# Patient Record
Sex: FEMALE | Race: WHITE | NOT HISPANIC OR LATINO | URBAN - METROPOLITAN AREA
[De-identification: names, ages, dates, MRNs, and addresses within clinical notes are randomized per-mention and may not be internally consistent; named-entity substitution may affect disease eponyms.]

---

## 2022-09-22 ENCOUNTER — TELEMEDICINE (OUTPATIENT)
Dept: PSYCHIATRY | Facility: CLINIC | Age: 42
End: 2022-09-22
Payer: COMMERCIAL

## 2022-09-22 DIAGNOSIS — F43.10 POST TRAUMATIC STRESS DISORDER (PTSD): ICD-10-CM

## 2022-09-22 DIAGNOSIS — F41.0 PANIC DISORDER (EPISODIC PAROXYSMAL ANXIETY): ICD-10-CM

## 2022-09-22 DIAGNOSIS — F41.1 GAD (GENERALIZED ANXIETY DISORDER): ICD-10-CM

## 2022-09-22 DIAGNOSIS — F98.8 ATTENTION DEFICIT DISORDER PREDOMINANT INATTENTIVE TYPE: ICD-10-CM

## 2022-09-22 DIAGNOSIS — F33.2 MODERATELY SEVERE RECURRENT MAJOR DEPRESSION (HCC): Primary | ICD-10-CM

## 2022-09-22 PROCEDURE — 90792 PSYCH DIAG EVAL W/MED SRVCS: CPT | Performed by: NURSE PRACTITIONER
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
## 2022-10-03 ENCOUNTER — TELEPHONE (OUTPATIENT)
Dept: PSYCHIATRY | Facility: CLINIC | Age: 42
End: 2022-10-03

## 2022-10-04 PROBLEM — F43.10 POST TRAUMATIC STRESS DISORDER (PTSD): Status: ACTIVE | Noted: 2022-10-04

## 2022-10-04 PROBLEM — F41.1 GAD (GENERALIZED ANXIETY DISORDER): Status: ACTIVE | Noted: 2022-10-04

## 2022-10-04 PROBLEM — F33.2 MODERATELY SEVERE RECURRENT MAJOR DEPRESSION (HCC): Status: ACTIVE | Noted: 2022-10-04

## 2022-10-04 PROBLEM — F41.0 PANIC DISORDER (EPISODIC PAROXYSMAL ANXIETY): Status: ACTIVE | Noted: 2022-10-04

## 2022-10-04 PROBLEM — F98.8 ATTENTION DEFICIT DISORDER PREDOMINANT INATTENTIVE TYPE: Status: ACTIVE | Noted: 2022-10-04

## 2022-10-07 ENCOUNTER — TELEMEDICINE (OUTPATIENT)
Dept: BEHAVIORAL/MENTAL HEALTH CLINIC | Facility: CLINIC | Age: 42
End: 2022-10-07
Payer: COMMERCIAL

## 2022-10-07 DIAGNOSIS — F33.2 MODERATELY SEVERE RECURRENT MAJOR DEPRESSION (HCC): Primary | ICD-10-CM

## 2022-10-07 PROCEDURE — 90791 PSYCH DIAGNOSTIC EVALUATION: CPT
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
## 2022-10-28 ENCOUNTER — TELEMEDICINE (OUTPATIENT)
Dept: BEHAVIORAL/MENTAL HEALTH CLINIC | Facility: CLINIC | Age: 42
End: 2022-10-28

## 2022-10-28 DIAGNOSIS — F33.2 MODERATELY SEVERE RECURRENT MAJOR DEPRESSION (HCC): Primary | ICD-10-CM

## 2022-10-28 DIAGNOSIS — F41.1 GAD (GENERALIZED ANXIETY DISORDER): ICD-10-CM

## 2022-10-28 NOTE — PSYCH
Psychotherapy Provided: Individual Psychotherapy 44 minutes     Length of time in session: 44 minutes, follow up in 1 week    Encounter Diagnosis     ICD-10-CM    1  Moderately severe recurrent major depression ([address] )  F33 2    2  DAYAMI (generalized anxiety disorder)  F41 1        Goals addressed in session: Goal 1, Goal 2 and Goal 3      Pain:      none    0    Current suicide risk : Low     Present during the session was Юлия Menezes and therapist   Lilliana Santiago discussed with therapist recent difficulties pertaining to her relationship with her mother  She became tearful when she talked about her mothers recent pace maker surgery and stated she does not want her mother to die  She stated her mother and her are fighting due to her mother not asking for help when she needs it  Юлия Riri stated her mom is toxic and that she needs to get out of the house  Therapist checked in with Юлия Menezes about how the process of filling out SSI paperwork has been going  Therapist asked if Юлия Menezes would be open to filling out an BUCK for therapist to connect with her CM, kira was open to receiving BUCK paperwork  Юлия Menezes informed therapist she broke up with her previous boyfriend due to lack of communication and is in a new relationship  Юлия Menezes became tearful when talking about her children and the lack of relationship  Toimarylou thought process was tangential and difficult to follow at times  Юлия Riri was informed therapist can no longer do virtual due to state regulations  Юлия Menezes is going to be finding transportation through her mother or [facility] transport  The tx plan was completed  ASSESSMENT- The overall mood of Kira was depressed, tearful, and engaged as evidenced by discussing   Interventions used during this session were CBT and DBT informed interventions, mindfulness, problem solving, solution focused and client based       PLAN: The next session is scheduled for tbd, kira needs to discuss transportation with mother and will focus on following up on Kira's depressed mood and completion of SSI paperwork  Behavioral Health Treatment Plan [facility]: Diagnosis and Treatment Plan explained to Reji Strickland relates understanding diagnosis and is agreeable to Treatment Plan  Yes   Virtual Regular Visit    Verification of patient location:    Patient is located in the following state in which I hold an active license [place]      Assessment/Plan:    Problem List Items Addressed This Visit        Other    Moderately severe recurrent major depression ([address] ) - Primary    DAYAMI (generalized anxiety disorder)          Goals addressed in session: Goal 1, Goal 2 and Goal 3           Reason for visit is No chief complaint on file  Encounter provider JOSEE Woods    Provider located at [address]  [address]  [phone]      Recent Visits  No visits were found meeting these conditions  Showing recent visits within past 7 days and meeting all other requirements  Future Appointments  No visits were found meeting these conditions  Showing future appointments within next 150 days and meeting all other requirements       The patient was identified by name and date of birth  Maryagnes Burkitt was informed that this is a telemedicine visit and that the visit is being conducted throughthe [employer] platform  She agrees to proceed     My office door was closed  No one else was in the room  She acknowledged consent and understanding of privacy and security of the video platform  The patient has agreed to participate and understands they can discontinue the visit at any time  Patient is aware this is a billable service  Subjective  Maryagnes Burkitt is a 36 y o  female  HPI     No past medical history on file  No past surgical history on file  No current outpatient medications on file       No current facility-administered medications for this visit  No Known Allergies    Review of Systems    Video Exam    There were no vitals filed for this visit      Physical Exam     Visit Time    Visit Start Time: 11:18 AM  Visit Stop Time: 12:10 PM  Total Visit Duration: 52 minutes

## 2022-10-28 NOTE — BH TREATMENT PLAN
Bridget Dueñas  1980       Date of Initial Treatment Plan: 10/28/22   Date of Current Treatment Plan: 10/28/22    Treatment Plan Number 1     Strengths/Personal Resources for Self Care: music, creative, good mom    Diagnosis:   1  Moderately severe recurrent major depression ([address] )     2  DAYAMI (generalized anxiety disorder)         Area of Needs: emotional regulation, depression symptoms, negative self-talk      Long Term Goal 1: "I need help managing my emotions"    Target Date: 4/28/23  Completion Date: TBD         Short Term Objectives for Goal 1: Payton Cleary will explore emotional regulation and identification techniques with therapist  She will master 5 DBT skills over the next 6 months  Long Term Goal 2: "I want to just decrease my depression"    Target Date: 4/28/23  Completion Date: TBD    Short Term Objectives for Goal 2: Payton Cleary will utilize CBT skills of thought challenging and reframing when negative self talk arises  This will decresae feelings of worthlessness 75% over the next 6 months  Long Term Goal # 3: "I want to get my life together "     Target Date: 4/28/22  Completion Date: TBD    Short Term Objectives for Goal 3: Payton Cleary will continue to meet with her  to complete steps to get SSI and affordable housing  Short Term Objectives for Goal 3: Payton Cleary will develop healthy communication skills to use when speaking with her children to increase visitation  GOAL 1: Modality: Individual 4x per month   Completion Date TBD    GOAL 2: Modality: Individual 4x per month   Completion Date TBD     GOAL 3: Modality: Individual 4x per month   Completion Date TBD      Behavioral Health Treatment Plan  Luke: Diagnosis and Treatment Plan explained to Shanice Gimeneza relates understanding diagnosis and is agreeable to Treatment Plan         Client Comments : Please share your thoughts, feelings, need and/or experiences regarding your treatment plan: "I like it, thank you "    Memo Timmons, 1980, actively participated in the creation of this treatment plan during a virtual session, using the [employer] platform  Memo Timmons  provided verbal consent on 10/28/2022 at 12:03 PM  The treatment plan was transcribed into the [address] Record at a later time

## 2022-11-04 ENCOUNTER — SOCIAL WORK (OUTPATIENT)
Dept: BEHAVIORAL/MENTAL HEALTH CLINIC | Facility: CLINIC | Age: 42
End: 2022-11-04

## 2022-11-04 NOTE — PSYCH
Psychotherapy Provided: Individual Psychotherapy 50 minutes     Length of time in session: 50 minutes    No diagnosis found  Goals addressed in session: Goal 1 and Goal 2     Pain:      none    0    Current suicide risk : Low     Present during the session was Alanna Carrero and therapist     Isi Malone - Therapist began the session by explaining to Alanna Carrero the need to transfer her to a new therapist  Therapist was able to explain to Alanna Carrero that she will begin seeing the new therapist, Nikkie Post next week  Alanna Carrreo was understanding  Kira used to remainder of the session to discuss progress being made in receiving SSI  Kira emailed therapist a form to fill out, therapist will forward to medical records  Alanna Carrero became tearful when talking about her children and how she continues to have an estranged relationship with her son  Alanna Carrero was able to reframe her negative thoughts to focus on bettering her own mental health before she can be the best mom she can be  ASSESSMENT- The overall mood of Kira was calm, engaged, and tearful at times as evidenced by actively participating in the session  Interventions used during this session were CBT and DBT informed interventions, mindfulness, problem solving, solution focused and client based  PLAN: The next session will be with her new therapist Jim Batres: Diagnosis and Treatment Plan explained to Lavon Almas relates understanding diagnosis and is agreeable to Treatment Plan   Yes     Visit start and stop times:    11/04/22  Start Time: 1015  Stop Time: 1105  Total Visit Time: 50 minutes

## 2022-11-17 ENCOUNTER — TELEMEDICINE (OUTPATIENT)
Dept: BEHAVIORAL/MENTAL HEALTH CLINIC | Facility: CLINIC | Age: 42
End: 2022-11-17

## 2022-11-17 DIAGNOSIS — F43.10 POST TRAUMATIC STRESS DISORDER (PTSD): Primary | ICD-10-CM

## 2022-11-21 NOTE — PSYCH
Virtual Regular Visit    Verification of patient location:    Patient is located in the following state in which I hold an active license [place]      Assessment/Plan:    Problem List Items Addressed This Visit        Other    Post traumatic stress disorder (PTSD) - Primary       Goals addressed in session: Goal 1          Reason for visit is No chief complaint on file  Encounter provider Yung Roman, [facility]    Provider located at [address] [place] [address] [address]  [address] [place] [address]  [phone]      Recent Visits  Date Type Provider Dept   11/17/22 JOSE Chavira 51, 1407 [facility] Therapist [facility]   Showing recent visits within past 7 days and meeting all other requirements  Future Appointments  No visits were found meeting these conditions  Showing future appointments within next 150 days and meeting all other requirements       The patient was identified by name and date of birth  Dallas Shepherd was informed that this is a telemedicine visit and that the visit is being conducted throughthe [employer] platform  She agrees to proceed     My office door was closed  No one else was in the room  She acknowledged consent and understanding of privacy and security of the video platform  The patient has agreed to participate and understands they can discontinue the visit at any time  Patient is aware this is a billable service  Ori Shepherd is a 36 y o  female    HPI     No past medical history on file  No past surgical history on file  No current outpatient medications on file  No current facility-administered medications for this visit  No Known Allergies    Review of Systems    Video Exam    There were no vitals filed for this visit  Physical Exam     D: This was this therapist and client's first session together  Therapist and client began to establish therapeutic rapport  Client shared some of her trauma hx, and her diagnosis of PTSD  Client shared that she dissociates  Client stated that she has had a BCM through [facility] since approximately 2014  Client shared that she has been sober from alcohol for 7 years, and has been off of heroin for 2 years  Client stated that she would like to work on her various relationships in therapy  A: Client appeared to be oriented x3  Client appeared to be in a pleasant mood  Client was very friendly  P: Therapist and client will meet again in 1 week, and will continue to establish therapeutic rapport      11/17/22  Start Time: 1504  Stop Time: 5273  Total Visit Time: 51 minutes

## 2022-11-30 ENCOUNTER — TELEMEDICINE (OUTPATIENT)
Dept: BEHAVIORAL/MENTAL HEALTH CLINIC | Facility: CLINIC | Age: 42
End: 2022-11-30

## 2022-11-30 DIAGNOSIS — F33.2 MODERATELY SEVERE RECURRENT MAJOR DEPRESSION (HCC): Primary | ICD-10-CM
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
## 2022-12-05 ENCOUNTER — TELEMEDICINE (OUTPATIENT)
Dept: BEHAVIORAL/MENTAL HEALTH CLINIC | Facility: CLINIC | Age: 42
End: 2022-12-05

## 2022-12-05 DIAGNOSIS — F33.2 MODERATELY SEVERE RECURRENT MAJOR DEPRESSION (HCC): Primary | ICD-10-CM

## 2022-12-05 NOTE — PSYCH
Virtual Regular Visit    Verification of patient location:    Patient is located in the following state in which I hold an active license [place]      Assessment/Plan:    Problem List Items Addressed This Visit        Other    Moderately severe recurrent major depression ([address] ) - Primary       Goals addressed in session: Goal 1 and Goal 2          Reason for visit is No chief complaint on file  Encounter provider Radha Bates, [facility]    Provider located at [address] [place] [address]  [phone]      Recent Visits  Date Type Provider Dept   11/30/22 Telemedicine Radha Bates, [facility]   Showing recent visits within past 7 days and meeting all other requirements  Today's Visits  Date Type Provider Dept   12/05/22 Telemedicine Radha Bates, [facility]   Showing today's visits and meeting all other requirements  Future Appointments  No visits were found meeting these conditions  Showing future appointments within next 150 days and meeting all other requirements       The patient was identified by name and date of birth  Naomi Mock was informed that this is a telemedicine visit and that the visit is being conducted throughthe [employer] platform  She agrees to proceed     My office door was closed  No one else was in the room  She acknowledged consent and understanding of privacy and security of the video platform  The patient has agreed to participate and understands they can discontinue the visit at any time  Patient is aware this is a billable service  Subjective  Naomi Mock is a 36 y o  female    HPI     No past medical history on file  No past surgical history on file  No current outpatient medications on file  No current facility-administered medications for this visit          No Known Allergies    Review of Systems    Video Exam    There were no vitals filed for this visit  Physical Exam     D: Client signed into session late, and was crying and rapidly sharing her thoughts with therapist  Client repeatedly stated that she "does not want to be here " Client denied having any plan, intent, or means to harm herself  Therapist and client discussed the reasons that client has to live  Client discussed her plans for the future  Client shared that she was upset mainly because of the stress she experiences due to living with her mother, Eleonora Murray  Client was told that she needs to immediately switch rooms  She is also worried that she will lose custody of the uncle she cares for due to authorities stating that they are coming out to make sure he is being cared for properly  In addition, client stated that she misses her children, and is very upset that she cannot see her son, Will  Therapist and client contracted for safety  Client acknowledged having the contact information for Crisis  A: Client appeared to be distraught due to current life stressors such as housing, familial relationships, and romantic relationships  Client appeared to be angry towards her mother  P: Therapist and client will meet again in 1 week, and will continue to build upon client's healthy coping skills        12/05/22  Start Time: 1120  Stop Time: 7947  Total Visit Time: 48 minutes

## 2022-12-15 ENCOUNTER — TELEMEDICINE (OUTPATIENT)
Dept: BEHAVIORAL/MENTAL HEALTH CLINIC | Facility: CLINIC | Age: 42
End: 2022-12-15

## 2022-12-15 DIAGNOSIS — F43.10 POST TRAUMATIC STRESS DISORDER (PTSD): Primary | ICD-10-CM

## 2022-12-19 ENCOUNTER — TELEMEDICINE (OUTPATIENT)
Dept: BEHAVIORAL/MENTAL HEALTH CLINIC | Facility: CLINIC | Age: 42
End: 2022-12-19

## 2022-12-19 DIAGNOSIS — F43.10 POST TRAUMATIC STRESS DISORDER (PTSD): Primary | ICD-10-CM
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
## 2022-12-20 NOTE — PSYCH
Virtual Regular Visit    Verification of patient location:    Patient is located in the following [place] in [place]      Assessment/Plan:    Problem List Items Addressed This Visit        Other    Post traumatic stress disorder (PTSD) - Primary       Goals addressed in session: Goal 2          Reason for visit is No chief complaint on file  Encounter provider Brody Esposito, [facility]    Provider located at [address] [place] [address] [address]  [address] [place] [address]  [phone]      Recent Visits  Date Type Provider Dept   12/19/22 R Precious Chavira 51, [facility] [facility] Therapist [facility]   12/15/22 Telemedicine Brody Espostio, [address] [facility] Therapist [facility]   Showing recent visits within past 7 days and meeting all other requirements  Future Appointments  No visits were found meeting these conditions  Showing future appointments within next 150 days and meeting all other requirements       The patient was identified by name and date of birth  Carlton Méndez was informed that this is a telemedicine visit and that the visit is being conducted throughthe [employer] platform  She agrees to proceed     My office door was closed  No one else was in the room  She acknowledged consent and understanding of privacy and security of the video platform  The patient has agreed to participate and understands they can discontinue the visit at any time  Patient is aware this is a billable service  Subjective  Carlton Méndez is a 36 y o  female    HPI     No past medical history on file  No past surgical history on file  No current outpatient medications on file  No current facility-administered medications for this visit  No Known Allergies    Review of Systems    Video Exam    There were no vitals filed for this visit      Physical Exam     D: Client shared that her son had visited her the weekend prior to this session, and that it had gone well  Client shared that she is setting up a studio in her attic for herself, her kids, and her friends  Client continued to franticly and angrily explain the struggles she deals with living in her house with her mother  Client repeatedly asked therapist if she was going to leave [facility], and seemed to suggest having therapist sign a contract stating such, and stated that her son's therapist signed a contract stating that she was not leaving  Client appeared to jump between topics, and repeatedly came back to angrily state that "someone should be fired" because she "always" gets kicked off of [facility]  A: Client appeared to be anxious and frantic at times, as well as angry and crying off and on      P: Therapist will meet with client again in 1 week, and will continue to discuss client's emotions      12/19/22  Start Time: 1102  Stop Time: 1375  Total Visit Time: 42 minutes

## 2022-12-29 ENCOUNTER — TELEMEDICINE (OUTPATIENT)
Dept: BEHAVIORAL/MENTAL HEALTH CLINIC | Facility: CLINIC | Age: 42
End: 2022-12-29

## 2022-12-29 DIAGNOSIS — F33.2 MODERATELY SEVERE RECURRENT MAJOR DEPRESSION (HCC): Primary | ICD-10-CM

## 2022-12-29 DIAGNOSIS — F41.1 GAD (GENERALIZED ANXIETY DISORDER): ICD-10-CM
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
## 2023-01-03 ENCOUNTER — TELEPHONE (OUTPATIENT)
Dept: PSYCHIATRY | Facility: CLINIC | Age: 43
End: 2023-01-03

## 2023-01-05 ENCOUNTER — TELEMEDICINE (OUTPATIENT)
Dept: BEHAVIORAL/MENTAL HEALTH CLINIC | Facility: CLINIC | Age: 43
End: 2023-01-05

## 2023-01-05 DIAGNOSIS — F41.0 PANIC DISORDER (EPISODIC PAROXYSMAL ANXIETY): Primary | ICD-10-CM

## 2023-01-05 DIAGNOSIS — F43.10 POST TRAUMATIC STRESS DISORDER (PTSD): ICD-10-CM

## 2023-01-09 ENCOUNTER — TELEMEDICINE (OUTPATIENT)
Dept: BEHAVIORAL/MENTAL HEALTH CLINIC | Facility: CLINIC | Age: 43
End: 2023-01-09

## 2023-01-09 DIAGNOSIS — F33.2 MODERATELY SEVERE RECURRENT MAJOR DEPRESSION (HCC): Primary | ICD-10-CM
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
## 2023-01-16 ENCOUNTER — TELEMEDICINE (OUTPATIENT)
Dept: BEHAVIORAL/MENTAL HEALTH CLINIC | Facility: CLINIC | Age: 43
End: 2023-01-16

## 2023-01-16 DIAGNOSIS — F43.10 POST TRAUMATIC STRESS DISORDER (PTSD): Primary | ICD-10-CM

## 2023-01-16 DIAGNOSIS — F33.2 MODERATELY SEVERE RECURRENT MAJOR DEPRESSION (HCC): ICD-10-CM
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
## 2023-01-23 ENCOUNTER — TELEMEDICINE (OUTPATIENT)
Dept: BEHAVIORAL/MENTAL HEALTH CLINIC | Facility: CLINIC | Age: 43
End: 2023-01-23

## 2023-01-23 DIAGNOSIS — F43.10 POST TRAUMATIC STRESS DISORDER (PTSD): ICD-10-CM

## 2023-01-23 DIAGNOSIS — F33.2 MODERATELY SEVERE RECURRENT MAJOR DEPRESSION (HCC): Primary | ICD-10-CM
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
## 2023-01-30 ENCOUNTER — TELEMEDICINE (OUTPATIENT)
Dept: BEHAVIORAL/MENTAL HEALTH CLINIC | Facility: CLINIC | Age: 43
End: 2023-01-30

## 2023-01-30 DIAGNOSIS — F33.2 MODERATELY SEVERE RECURRENT MAJOR DEPRESSION (HCC): Primary | ICD-10-CM

## 2023-01-30 DIAGNOSIS — F43.10 POST TRAUMATIC STRESS DISORDER (PTSD): ICD-10-CM

## 2023-02-06 ENCOUNTER — TELEMEDICINE (OUTPATIENT)
Dept: BEHAVIORAL/MENTAL HEALTH CLINIC | Facility: CLINIC | Age: 43
End: 2023-02-06

## 2023-02-06 DIAGNOSIS — F43.10 POST TRAUMATIC STRESS DISORDER (PTSD): Primary | ICD-10-CM

## 2023-02-06 DIAGNOSIS — F33.2 MODERATELY SEVERE RECURRENT MAJOR DEPRESSION (HCC): ICD-10-CM

## 2023-02-06 NOTE — PSYCH
Behavioral Health Psychotherapy Progress Note    Psychotherapy Provided: Individual Psychotherapy     1  Moderately severe recurrent major depression ([facility] [address] )        2  Post traumatic stress disorder (PTSD)            Goals addressed in session: Goal 3      DATA: Client shared that she had spent most of the past weekend with her daughter, Nessa Patel  Client shared that she has started having vivid dreams again, both positive and negative dreams, but appeared relieved to be having them again  Therapist and client continued to process her relationship with her son, Vitor  Client stated that she had asked Candida Sneed to ask Will to visit client for the weekend  Therapist challenged client when she stated she was respecting [facility] boundaries and desire for space  Every time therapist challenged client's thinking, choices, and behaviors, client stated that she was doing what therapist was recommending, for example, stating that she was respecting Leroy Abisai  Client stated that she continues to urinate on herself daily due to PTSD, and that stress is her trigger  During this session, this clinician used the following therapeutic modalities: Engagement Strategies, Cognitive Behavioral Therapy, Family Therapy, Solution-Focused Therapy and Supportive Psychotherapy    Substance Abuse was not addressed during this session  If the client is diagnosed with a co-occurring substance use disorder, please indicate any changes in the frequency or amount of use: no longer uses  Stage of change for addressing substance use diagnoses: Maintenance    ASSESSMENT:  Amador Marrero presents with a Euthymic/ normal, Angry and Anxious mood  her affect is Normal range and intensity, which is congruent, with her mood and the content of the session  The client has made progress on their goals  Amador Marrero presents with a none risk of suicide, none risk of self-harm, and none risk of harm to others      For any risk assessment that surpasses a "low" rating, a safety plan must be developed  A safety plan was indicated: no  If yes, describe in detail NA    PLAN: Between sessions, Willie Rosen will respect boundaries  At the next session, the therapist will use Engagement Strategies, Cognitive Behavioral Therapy, Family Therapy, Mindfulness-based Strategies, Solution-Focused Therapy and Supportive Psychotherapy to address boundaries and coping skills  Behavioral Health Treatment Plan and Discharge Planning: Willie Rosen is aware of and agrees to continue to work on their treatment plan  They have identified and are working toward their discharge goals  yes    Virtual Regular Visit    Verification of patient location:    Patient is located in the following state in which I hold an active license [place]      Assessment/Plan:    Problem List Items Addressed This Visit        Other    Moderately severe recurrent major depression ([facility] [address] ) - Primary    Post traumatic stress disorder (PTSD)       Goals addressed in session: Goal 3           Reason for visit is No chief complaint on file  Encounter provider Pradeep Crespo, [facility]    Provider located at [address] [place] [address]  [phone]      Recent Visits  Date Type Provider Dept   01/30/23 Telemedicine Pradeep Crespo, [address] [place] [address] Therapist op   Showing recent visits within past 7 days and meeting all other requirements  Future Appointments  No visits were found meeting these conditions  Showing future appointments within next 150 days and meeting all other requirements       The patient was identified by name and date of birth  Willie Rosen was informed that this is a telemedicine visit and that the visit is being conducted throughthe [employer] platform  She agrees to proceed     My office door was closed  No one else was in the room    She acknowledged consent and understanding of privacy and security of the video platform  The patient has agreed to participate and understands they can discontinue the visit at any time  Patient is aware this is a billable service  Subjective  Jean Pierer Syed is a 36 y o  female    HPI     No past medical history on file  No past surgical history on file  No current outpatient medications on file  No current facility-administered medications for this visit  No Known Allergies    Review of Systems    Video Exam    There were no vitals filed for this visit      Physical Exam     01/30/23  Start Time: 1101  Stop Time: 8189  Total Visit Time: 44 minutes

## 2023-02-13 ENCOUNTER — TELEPHONE (OUTPATIENT)
Dept: PSYCHIATRY | Facility: CLINIC | Age: 43
End: 2023-02-13

## 2023-02-19 NOTE — PSYCH
Behavioral Health Psychotherapy Progress Note    Psychotherapy Provided: Individual Psychotherapy     1  Post traumatic stress disorder (PTSD)        2  Moderately severe recurrent major depression ([facility] [address] )            Goals addressed in session: Goal 1 and Goal 2     DATA: Client shared that she has begun to have vivid dreams again, and that she sees this as a sign of her beginning to heal  Therapist and client continued to process some of the trauma that client has experienced, and how this impacts her various relationships including those with Chioma De La O, her father, Jeanie Sibley, and her brother  During this session, this clinician used the following therapeutic modalities: Engagement Strategies, Mindfulness-based Strategies and Supportive Psychotherapy    Substance Abuse was addressed during this session  If the client is diagnosed with a co-occurring substance use disorder, please indicate any changes in the frequency or amount of use: NA  Stage of change for addressing substance use diagnoses: Maintenance    ASSESSMENT:  Gennaro Chang presents with a Euthymic/ normal, Anxious, Depressed and Elated mood  her affect is Overbright and Tearful, which is congruent, with her mood and the content of the session  The client has made progress on their goals  Gennaro Chang presents with a none risk of suicide, none risk of self-harm, and none risk of harm to others  For any risk assessment that surpasses a "low" rating, a safety plan must be developed  A safety plan was indicated: no  If yes, describe in detail NA    PLAN: Between sessions, Gennaro Chang will use her coping skills  At the next session, the therapist will use Engagement Strategies, Cognitive Processing Therapy, Mindfulness-based Strategies, Solution-Focused Therapy and Supportive Psychotherapy to address trauma hx and coping skills      Behavioral Health Treatment Plan and Discharge Planning: Gennaro Chang is aware of and agrees to continue to work on their treatment plan  They have identified and are working toward their discharge goals  yes    Virtual Regular Visit    Verification of patient location:    Patient is located in the following state in which I hold an active license [place]      Assessment/Plan:    Problem List Items Addressed This Visit        Other    Moderately severe recurrent major depression ([address] )    Post traumatic stress disorder (PTSD) - Primary       Goals addressed in session: Goal 1 and Goal 2          Reason for visit is No chief complaint on file  Encounter provider Ana Lacey [facility]    Provider located at [address] [place] [address]  [phone]      Recent Visits  Date Type Provider Dept   02/13/23 Telephone Ana Lacey, [address] recent visits within past 7 days and meeting all other requirements  Future Appointments  No visits were found meeting these conditions  Showing future appointments within next 150 days and meeting all other requirements       The patient was identified by name and date of birth  Shane Whitaker was informed that this is a telemedicine visit and that the visit is being conducted throughthe [employer] platform  She agrees to proceed     My office door was closed  No one else was in the room  She acknowledged consent and understanding of privacy and security of the video platform  The patient has agreed to participate and understands they can discontinue the visit at any time  Patient is aware this is a billable service  Subjective  Shane Whitaker is a 36 y o  female    HPI     No past medical history on file  No past surgical history on file  No current outpatient medications on file  No current facility-administered medications for this visit          No Known Allergies    Review of Systems    Video Exam    There were no vitals filed for this visit     Physical Exam     02/06/23  Start Time: 3169  Stop Time: 8802  Total Visit Time: 39 minutes

## 2023-02-20 ENCOUNTER — TELEMEDICINE (OUTPATIENT)
Dept: BEHAVIORAL/MENTAL HEALTH CLINIC | Facility: CLINIC | Age: 43
End: 2023-02-20

## 2023-02-20 DIAGNOSIS — F41.1 GAD (GENERALIZED ANXIETY DISORDER): ICD-10-CM

## 2023-02-20 DIAGNOSIS — F43.10 POST TRAUMATIC STRESS DISORDER (PTSD): ICD-10-CM

## 2023-02-20 DIAGNOSIS — F33.2 MODERATELY SEVERE RECURRENT MAJOR DEPRESSION (HCC): Primary | ICD-10-CM

## 2023-02-27 ENCOUNTER — TELEMEDICINE (OUTPATIENT)
Dept: BEHAVIORAL/MENTAL HEALTH CLINIC | Facility: CLINIC | Age: 43
End: 2023-02-27

## 2023-02-27 DIAGNOSIS — F33.2 MODERATELY SEVERE RECURRENT MAJOR DEPRESSION (HCC): ICD-10-CM

## 2023-02-27 DIAGNOSIS — F41.1 GAD (GENERALIZED ANXIETY DISORDER): ICD-10-CM

## 2023-02-27 DIAGNOSIS — F43.10 POST TRAUMATIC STRESS DISORDER (PTSD): Primary | ICD-10-CM

## 2023-03-06 NOTE — PSYCH
Behavioral Health Psychotherapy Progress Note    Psychotherapy Provided: Individual Psychotherapy     1  Moderately severe recurrent major depression ([address] )        2  DAYAMI (generalized anxiety disorder)        3  Post traumatic stress disorder (PTSD)            Goals addressed in session: Goal 1 and Goal 2     DATA: Therapist provided psycho-education on breaking things down into more manageable pieces  Therapist and client continued to process client's feelings about her mother, Flaca Jensen  Therapist and client continued to process client's anger towards her brother  Client identified that across her dreams is the theme of "conquering evil" through Judaism  When asked about her appeared increased interest in Judaism, 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      During this session, this clinician used the following therapeutic modalities: Engagement Strategies and Supportive Psychotherapy    Substance Abuse was not addressed during this session  If the client is diagnosed with a co-occurring substance use disorder, please indicate any changes in the frequency or amount of use: NA  Stage of change for addressing substance use diagnoses: Maintenance    ASSESSMENT:  Taran Loredo presents with a Euthymic/ normal, Angry, Anxious and Depressed mood  her affect is intense, which is congruent, with her mood and the content of the session  The client has made progress on their goals  Taran Loredo presents with a none risk of suicide, none risk of self-harm, and minimal risk of harm to others  For any risk assessment that surpasses a "low" rating, a safety plan must be developed  A safety plan was indicated: no  If yes, describe in detail NA    PLAN: Between sessions, Taran Loredo will use her coping skills   At the next session, the therapist will use Engagement Strategies, Cognitive Processing Therapy and Supportive Psychotherapy to address client's hx of trauma  Behavioral Health Treatment Plan and Discharge Planning: Nica Boone is aware of and agrees to continue to work on their treatment plan  They have identified and are working toward their discharge goals  yes    Virtual Regular Visit    Verification of patient location:    Patient is located in the following state in which I hold an active license [place]      Assessment/Plan:    Problem List Items Addressed This Visit        Other    Moderately severe recurrent major depression ([facility] [address] [facility] ) - Primary    DAYAMI (generalized anxiety disorder)    Post traumatic stress disorder (PTSD)       Goals addressed in session: Goal 1 and Goal 2          Reason for visit is No chief complaint on file  Encounter provider Yomaira Weiner, [facility]    Provider located at [address] [place], [place] [id number]  [address] [place] [address]  [phone]      Recent Visits  No visits were found meeting these conditions  Showing recent visits within past 7 days and meeting all other requirements  Future Appointments  No visits were found meeting these conditions  Showing future appointments within next 150 days and meeting all other requirements       The patient was identified by name and date of birth  Nica Boone was informed that this is a telemedicine visit and that the visit is being conducted throughthe [employer] platform  She agrees to proceed     My office door was closed  No one else was in the room  She acknowledged consent and understanding of privacy and security of the video platform  The patient has agreed to participate and understands they can discontinue the visit at any time  Patient is aware this is a billable service  Subjective  Nica Boone is a 36 y o  female    HPI     No past medical history on file  No past surgical history on file  No current outpatient medications on file       No current facility-administered medications for this visit  No Known Allergies    Review of Systems    Video Exam    There were no vitals filed for this visit      Physical Exam     02/20/23  Start Time: 1107  Stop Time: 0656  Total Visit Time: 40 minutes
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
## 2023-03-13 ENCOUNTER — TELEMEDICINE (OUTPATIENT)
Dept: BEHAVIORAL/MENTAL HEALTH CLINIC | Facility: CLINIC | Age: 43
End: 2023-03-13

## 2023-03-13 DIAGNOSIS — F43.10 POST TRAUMATIC STRESS DISORDER (PTSD): Primary | ICD-10-CM

## 2023-03-13 DIAGNOSIS — F33.2 MODERATELY SEVERE RECURRENT MAJOR DEPRESSION (HCC): ICD-10-CM

## 2023-03-20 ENCOUNTER — TELEMEDICINE (OUTPATIENT)
Dept: BEHAVIORAL/MENTAL HEALTH CLINIC | Facility: CLINIC | Age: 43
End: 2023-03-20

## 2023-03-20 DIAGNOSIS — F43.10 POST TRAUMATIC STRESS DISORDER (PTSD): Primary | ICD-10-CM
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
## 2023-04-03 ENCOUNTER — TELEMEDICINE (OUTPATIENT)
Dept: BEHAVIORAL/MENTAL HEALTH CLINIC | Facility: CLINIC | Age: 43
End: 2023-04-03

## 2023-04-03 DIAGNOSIS — F33.2 MODERATELY SEVERE RECURRENT MAJOR DEPRESSION (HCC): Primary | ICD-10-CM

## 2023-04-03 DIAGNOSIS — F43.10 POST TRAUMATIC STRESS DISORDER (PTSD): ICD-10-CM
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
## 2023-04-10 ENCOUNTER — TELEMEDICINE (OUTPATIENT)
Dept: BEHAVIORAL/MENTAL HEALTH CLINIC | Facility: CLINIC | Age: 43
End: 2023-04-10

## 2023-04-10 DIAGNOSIS — F43.10 POST TRAUMATIC STRESS DISORDER (PTSD): ICD-10-CM

## 2023-04-10 DIAGNOSIS — F33.2 MODERATELY SEVERE RECURRENT MAJOR DEPRESSION (HCC): Primary | ICD-10-CM

## 2023-04-27 NOTE — PSYCH
"Behavioral Health Psychotherapy Progress Note    Psychotherapy Provided: Individual Psychotherapy     1  Moderately severe recurrent major depression ([address] )        2  Post traumatic stress disorder (PTSD)            Goals addressed in session: Goal 1 and Goal 3      DATA:  Therapist and client continued to discuss and process client's various relationships, focusing on those with her mother, son, and brother  Client cried off and on throughout session  Client appears to be struggling to move past her hospitalization and PFA from December, and the sense of betrayal by her mother for that  During this session, this clinician used the following therapeutic modalities: Engagement Strategies, Cognitive Behavioral Therapy, Family Therapy and Supportive Psychotherapy    Substance Abuse was not addressed during this session  If the client is diagnosed with a co-occurring substance use disorder, please indicate any changes in the frequency or amount of use: NA  Stage of change for addressing substance use diagnoses: Maintenance    ASSESSMENT:  Eve Rouse presents with a Angry and Depressed mood  her affect is Tearful and intense, which is congruent, with her mood and the content of the session  The client has made progress on their goals  Eve Rouse presents with a none risk of suicide, none risk of self-harm, and none risk of harm to others  For any risk assessment that surpasses a \"low\" rating, a safety plan must be developed  A safety plan was indicated: no  If yes, describe in detail NA    PLAN: Between sessions, Eve Rouse will practice setting and respecting boundaries  At the next session, the therapist will use Mindfulness-based Strategies to address coping skill building  Behavioral Health Treatment Plan and Discharge Planning: Eve Rouse is aware of and agrees to continue to work on their treatment plan  They have identified and are working toward their discharge goals   " yes    Virtual Regular Visit    Verification of patient location:    Patient is located at Home in the following state in which I hold an active license [place]      Assessment/Plan:    Problem List Items Addressed This Visit        Other    Moderately severe recurrent major depression ([address] ) - Primary    Post traumatic stress disorder (PTSD)       Goals addressed in session: Goal 1 and Goal 3           Reason for visit is No chief complaint on file  Encounter provider Deepa Alford, [facility]    Provider located at [address] [place] [address]  [phone]      Recent Visits  No visits were found meeting these conditions  Showing recent visits within past 7 days and meeting all other requirements  Future Appointments  No visits were found meeting these conditions  Showing future appointments within next 150 days and meeting all other requirements       The patient was identified by name and date of birth  Adam Mai was informed that this is a telemedicine visit and that the visit is being conducted throughthe [employer] platform  She agrees to proceed     My office door was closed  No one else was in the room  She acknowledged consent and understanding of privacy and security of the video platform  The patient has agreed to participate and understands they can discontinue the visit at any time  Patient is aware this is a billable service  Subjective  Adam Mai is a 39 y o  female    HPI     No past medical history on file  No past surgical history on file  No current outpatient medications on file  No current facility-administered medications for this visit  No Known Allergies    Review of Systems    Video Exam    There were no vitals filed for this visit      Physical Exam     04/10/23  Start Time: 7048  Stop Time: 1148  Total Visit Time: 46 minutes

## 2023-05-01 ENCOUNTER — TELEMEDICINE (OUTPATIENT)
Dept: BEHAVIORAL/MENTAL HEALTH CLINIC | Facility: CLINIC | Age: 43
End: 2023-05-01

## 2023-05-01 DIAGNOSIS — F41.1 GAD (GENERALIZED ANXIETY DISORDER): ICD-10-CM

## 2023-05-01 DIAGNOSIS — F33.2 MODERATELY SEVERE RECURRENT MAJOR DEPRESSION (HCC): Primary | ICD-10-CM

## 2023-05-01 DIAGNOSIS — F41.0 PANIC DISORDER (EPISODIC PAROXYSMAL ANXIETY): ICD-10-CM

## 2023-05-01 DIAGNOSIS — F98.8 ATTENTION DEFICIT DISORDER PREDOMINANT INATTENTIVE TYPE: ICD-10-CM

## 2023-05-01 DIAGNOSIS — F43.10 POST TRAUMATIC STRESS DISORDER (PTSD): ICD-10-CM

## 2023-05-01 NOTE — PSYCH
Behavioral Health Psychotherapy Progress Note    Psychotherapy Provided: Individual Psychotherapy     1  Moderately severe recurrent major depression ([address] )        2  DAYAMI (generalized anxiety disorder)        3  Post traumatic stress disorder (PTSD)        4  Panic disorder (episodic paroxysmal anxiety)        5  Attention deficit disorder predominant inattentive type            Goals addressed in session: Goal 1, Goal 2 and Goal 3      DATA: Therapist and client updated client's treatment plan  Client read an aggressive and accusatory letter that she had written and sent to her  due to him being unwilling to consult with her until she paid the $30 balance on her account  Client saw this as an abandonment of her and of his duties, due to her history of trauma and struggles with funds  Therapist challenged this, and pointed out that all , unless they agreed to work pro-irma, will expect payment for their services  Client acknowledged experiencing SI, but denied having any plan, intent, or means to harm herself  Client contracted for safety  Client shared that she believes her housemate, Preeti Parker, is stealing from her  Client changed back and forth between angry, and then sad and crying, throughout session  Whenever therapist challenged a statement or thinking pattern of client's, client would agree as though it was obvious, and contradict her previous statement, as though she had not made it  During this session, this clinician used the following therapeutic modalities: Cognitive Behavioral Therapy and Supportive Psychotherapy    Substance Abuse was not addressed during this session  If the client is diagnosed with a co-occurring substance use disorder, please indicate any changes in the frequency or amount of use: no change  Stage of change for addressing substance use diagnoses: No substance use/Not applicable    ASSESSMENT:  Hannah Tse presents with a Angry and Depressed mood       her affect is "Normal range and intensity and Tearful, which is congruent, with her mood and the content of the session  The client has made progress on their goals  Romel Estrada presents with a minimal risk of suicide, minimal risk of self-harm, and none risk of harm to others  For any risk assessment that surpasses a \"low\" rating, a safety plan must be developed  A safety plan was indicated: no  If yes, describe in detail NA    PLAN: Between sessions, Romel Estrada will use her coping skills  At the next session, the therapist will use Cognitive Behavioral Therapy to address challenging client's thinking patterns  Behavioral Health Treatment Plan and Discharge Planning: Romel Estrada is aware of and agrees to continue to work on their treatment plan  They have identified and are working toward their discharge goals  yes    Virtual Regular Visit    Verification of patient location:    Patient is located at Home in the following state in which I hold an active license [place]      Assessment/Plan:    Problem List Items Addressed This Visit        Other    Moderately severe recurrent major depression ([facility] ) - Primary    DAYAMI (generalized anxiety disorder)    Post traumatic stress disorder (PTSD)    Panic disorder (episodic paroxysmal anxiety)    Attention deficit disorder predominant inattentive type       Goals addressed in session: Goal 1, Goal 2 and Goal 3           Reason for visit is No chief complaint on file  Encounter provider Sondra Aguero [facility]    Provider located at [address] [place] [address]  [phone]      Recent Visits  No visits were found meeting these conditions    Showing recent visits within past 7 days and meeting all other requirements  Today's Visits  Date Type Provider Dept   05/01/23 Telemedicine Sondra Aguero [address] [place] [address] Therapist op   Showing today's visits and meeting all " other requirements  Future Appointments  No visits were found meeting these conditions  Showing future appointments within next 150 days and meeting all other requirements       The patient was identified by name and date of birth  Jerod Merry was informed that this is a telemedicine visit and that the visit is being conducted throughthe ipDatatel platform  She agrees to proceed     My office door was closed  No one else was in the room  She acknowledged consent and understanding of privacy and security of the video platform  The patient has agreed to participate and understands they can discontinue the visit at any time  Patient is aware this is a billable service  Subjective  Jerod Sousa is a 39 y o  female    HPI     No past medical history on file  No past surgical history on file  No current outpatient medications on file  No current facility-administered medications for this visit  No Known Allergies    Review of Systems    Video Exam    There were no vitals filed for this visit      Physical Exam     05/01/23  Start Time: 9530  Stop Time: 1150  Total Visit Time: 48 minutes

## 2023-05-01 NOTE — BH TREATMENT PLAN
"[address]  1980     Date of Initial Psychotherapy Assessment: 10/28/22  Date of Current Treatment Plan: 05/01/23  Treatment Plan Target Date: 10/31/23  Treatment Plan Expiration Date: 10/31/23    Diagnosis:   1  Moderately severe recurrent major depression ([address] )        2  DAYAMI (generalized anxiety disorder)        3  Post traumatic stress disorder (PTSD)        4  Panic disorder (episodic paroxysmal anxiety)        5  Attention deficit disorder predominant inattentive type            Area(s) of Need: Depression, PTSD, anxiety    Long Term Goal 1 (in the client's own words): \"I need help managing my emotions\"    Stage of Change: Contemplation    Target Date for completion: 10/31/23     Anticipated therapeutic modalities: CBT, coping skills     People identified to complete this goal: client, therapist      Objective 1: (identify the means of measuring success in meeting the objective): Isha Quigley will explore emotional regulation and identification techniques with therapist  She will master 5 DBT skills over the next 6 months  Objective 2: (identify the means of measuring success in meeting the objective): NA      Long Term Goal 2 (in the client's own words): \"I want to just decrease my depression\"    Stage of Change: Contemplation    Target Date for completion: 10/31/23     Anticipated therapeutic modalities: CBT, coping skills     People identified to complete this goal: client, therapist      Objective 1: (identify the means of measuring success in meeting the objective): Isha Warrenchdianna will utilize CBT skills of thought challenging and reframing when negative self talk arises  This will decresae feelings of worthlessness 75% over the next 6 months  Objective 2: (identify the means of measuring success in meeting the objective): NA     Long Term Goal 3 (in the client's own words): \"I want to get my life together  \"     Stage of Change: " Contemplation    Target Date for completion: 10/31/23     Anticipated therapeutic modalities: CBT, coping skills     People identified to complete this goal: client, therapist      Objective 1: (identify the means of measuring success in meeting the objective): Memo Gerardo will continue to meet with her  to complete steps to get SSI and affordable housing  Objective 2: (identify the means of measuring success in meeting the objective): Memo Gerardo will develop healthy communication skills to use when speaking with her children to increase visitation  I am currently under the care of a [facility] psychiatric provider: yes    My [facility] psychiatric provider is: Otf Reef am currently taking psychiatric medications: Yes, as prescribed    I feel that I will be ready for discharge from mental health care when I reach the following (measurable goal/objective): goals met    For children and adults who have a legal guardian:   Has there been any change to custody orders and/or guardianship status? NA  If yes, attach updated documentation  I have created my Crisis Plan and have been offered a copy of this plan    [address]: Diagnosis and Treatment Plan explained to [facility] acknowledges an understanding of their diagnosis  Sonia Temple agrees to this treatment plan      I have been offered a copy of this Treatment Plan  yes

## 2023-05-08 ENCOUNTER — TELEMEDICINE (OUTPATIENT)
Dept: BEHAVIORAL/MENTAL HEALTH CLINIC | Facility: CLINIC | Age: 43
End: 2023-05-08

## 2023-05-08 DIAGNOSIS — F41.1 GAD (GENERALIZED ANXIETY DISORDER): ICD-10-CM

## 2023-05-08 DIAGNOSIS — F43.10 POST TRAUMATIC STRESS DISORDER (PTSD): Primary | ICD-10-CM

## 2023-05-08 DIAGNOSIS — F33.2 MODERATELY SEVERE RECURRENT MAJOR DEPRESSION (HCC): ICD-10-CM

## 2023-05-08 NOTE — PSYCH
Behavioral Health Psychotherapy Progress Note    Psychotherapy Provided: Individual Psychotherapy     1  Post traumatic stress disorder (PTSD)        2  Moderately severe recurrent major depression ([address] )        3  DAYAMI (generalized anxiety disorder)            Goals addressed in session: Goal 1, Goal 2 and Goal 3      DATA: Client shared that the electricity might be turned off at [facility], and that her uncle Audery Libman won't be coming to live with them anymore  Client was crying and yelling off and on throughout the session, and was ranting  Client expressed experiencing SI but denied having any plan, intent, or means to harm herself  Client contracted for safety  Client listed what she has to live for  Client continued to express her anger that her son, Will, won't speak to her, and that [employer] cannot and will not, force Will to speak to her  Client also continued to express her anger that her  will not work with her anymore as she has a balance on her account  Client repeatedly yelled that she has not done anything wrong for negative things to be happening to her  Client ranted throughout the entirety of the session, so that therapist did not get to speak much during the session  During this session, this clinician used the following therapeutic modalities: Engagement Strategies, Family Therapy and Supportive Psychotherapy    Substance Abuse was not addressed during this session  If the client is diagnosed with a co-occurring substance use disorder, please indicate any changes in the frequency or amount of use: no change  Stage of change for addressing substance use diagnoses: Maintenance    ASSESSMENT:  Amy Maki presents with a Angry and Depressed mood  her affect is Tearful and very intense, which is congruent, with her mood and the content of the session  The client has not made progress on their goals       Amy Maki presents with a minimal risk of suicide, minimal risk of self-harm, "and none risk of harm to others  For any risk assessment that surpasses a \"low\" rating, a safety plan must be developed  A safety plan was indicated: no  If yes, describe in detail NA    PLAN: Between sessions, Andrea Bermudez will use her coping skills  At the next session, the therapist will use Mindfulness-based Strategies to address building coping skills  Behavioral Health Treatment Plan and Discharge Planning: Andrea Bermudez is aware of and agrees to continue to work on their treatment plan  They have identified and are working toward their discharge goals  yes    Virtual Regular Visit    Verification of patient location:    Patient is located at Home in the following state in which I hold an active license [place]      Assessment/Plan:    Problem List Items Addressed This Visit        Other    Moderately severe recurrent major depression ([facility] )    DAYAMI (generalized anxiety disorder)    Post traumatic stress disorder (PTSD) - Primary       Goals addressed in session: Goal 1, Goal 2 and Goal 3           Reason for visit is No chief complaint on file  Encounter provider Con Francisco, [facility]    Provider located at [address] [place] [address]  [phone]      Recent Visits  Date Type Provider Dept   05/01/23 Telemedicine Con Lamy, [address] Therapist [facility]   Showing recent visits within past 7 days and meeting all other requirements  Today's Visits  Date Type Provider Dept   05/08/23 Telemedicine Con Francisco, [address] Therapist [facility]   Showing today's visits and meeting all other requirements  Future Appointments  No visits were found meeting these conditions  Showing future appointments within next 150 days and meeting all other requirements       The patient was identified by name and date of birth   Andrea Bermudez was informed that this is a telemedicine visit and that the " visit is being conducted throughthe mGenerator platform  She agrees to proceed     My office door was closed  No one else was in the room  She acknowledged consent and understanding of privacy and security of the video platform  The patient has agreed to participate and understands they can discontinue the visit at any time  Patient is aware this is a billable service  Ori Mckee is a 39 y o  female    HPI     No past medical history on file  No past surgical history on file  No current outpatient medications on file  No current facility-administered medications for this visit  No Known Allergies    Review of Systems    Video Exam    There were no vitals filed for this visit      Physical Exam     05/08/23  Start Time: 1106  Stop Time: 1150  Total Visit Time: 44 minutes

## 2023-05-15 ENCOUNTER — TELEMEDICINE (OUTPATIENT)
Dept: BEHAVIORAL/MENTAL HEALTH CLINIC | Facility: CLINIC | Age: 43
End: 2023-05-15

## 2023-05-15 DIAGNOSIS — F33.2 MODERATELY SEVERE RECURRENT MAJOR DEPRESSION (HCC): Primary | ICD-10-CM

## 2023-05-15 DIAGNOSIS — F41.1 GAD (GENERALIZED ANXIETY DISORDER): ICD-10-CM

## 2023-05-15 DIAGNOSIS — F43.10 POST TRAUMATIC STRESS DISORDER (PTSD): ICD-10-CM

## 2023-05-15 NOTE — PSYCH
Behavioral Health Psychotherapy Progress Note    Psychotherapy Provided: Individual Psychotherapy     1  Moderately severe recurrent major depression ([facility] [address] )        2  DAYAMI (generalized anxiety disorder)        3  Post traumatic stress disorder (PTSD)            Goals addressed in session: Goal 1, Goal 2 and Goal 3      DATA: Therapist and client met virtually (Epic would not allow for therapist to add in a virtual session note after therapist completed a crisis plan note during session)  Client shared that she was diagnosed with diabetes last week  Client shared that she is in a lot of pain  Client shared that she has been struggling with scabies  Therapist and client continued to process client's feelings about missing her son, Will  While client acknowledged having SI, she denied having any plan, intent, or means to harm herself  Therapist and client created a crisis plan  Client shared that she is looking to transfer to [facility] for therapy due to [employer] being unable to force Will to engage in family therapy, and so all of her services are through one organization  Client was crying off and on throughout session  During this session, this clinician used the following therapeutic modalities: Cognitive Behavioral Therapy, Family Therapy and Supportive Psychotherapy    Substance Abuse was not addressed during this session  If the client is diagnosed with a co-occurring substance use disorder, please indicate any changes in the frequency or amount of use: no change  Stage of change for addressing substance use diagnoses: Maintenance    ASSESSMENT:  Kerry Lockett presents with a Euthymic/ normal, Angry and Depressed mood  her affect is intense, which is congruent, with her mood and the content of the session  The client has made progress on their goals  Kerry Lockett presents with a minimal risk of suicide, none risk of self-harm, and none risk of harm to others      For any risk assessment that "surpasses a \"low\" rating, a safety plan must be developed  A safety plan was indicated: yes  If yes, describe in detail crisis plan developed    PLAN: Between sessions, Elvia Puente will use her coping skills  At the next session, the therapist will use Mindfulness-based Strategies to address building coping skills  Behavioral Health Treatment Plan and Discharge Planning: Elvia Puente is aware of and agrees to continue to work on their treatment plan  They have identified and are working toward their discharge goals  yes    Visit start and stop times:    05/15/23  Start Time: 1115  Stop Time: 1150  Total Visit Time: 35 minutes     Virtual Regular Visit    Verification of patient location:    Patient is located at Home in the following state in which I hold an active license [place]      Assessment/Plan:    Problem List Items Addressed This Visit        Other    Moderately severe recurrent major depression ([address] ) - Primary    DAYAMI (generalized anxiety disorder)    Post traumatic stress disorder (PTSD)       Goals addressed in session: Goal 1, Goal 2 and Goal 3           Reason for visit is No chief complaint on file  Encounter provider Margi Gomes, [facility] [place]    Provider located at [address] [place] [address]  [phone]      Recent Visits  Date Type Provider Dept   05/08/23 Telemedicine GreenIQaleksey Gomes, [address] [facility] Therapist [facility]   Showing recent visits within past 7 days and meeting all other requirements  Today's Visits  Date Type Provider Dept   05/15/23 Telemedicine GreenIQaleksey Gomes [address] [facility] Therapist [facility]   Showing today's visits and meeting all other requirements  Future Appointments  No visits were found meeting these conditions  Showing future appointments within next 150 days and meeting all other requirements       The patient was identified by name and date of birth   Suoleymane Fletcher" Serena Abbasi was informed that this is a telemedicine visit and that the visit is being conducted throughthe [employer] platform  She agrees to proceed     My office door was closed  No one else was in the room  She acknowledged consent and understanding of privacy and security of the video platform  The patient has agreed to participate and understands they can discontinue the visit at any time  Patient is aware this is a billable service  Subjective  Sophie Fragoso is a 39 y o  female    HPI     No past medical history on file  No past surgical history on file  No current outpatient medications on file  No current facility-administered medications for this visit  No Known Allergies    Review of Systems    Video Exam    There were no vitals filed for this visit      Physical Exam     05/15/23  Start Time: 8135  Stop Time: 1150  Total Visit Time: 35 minutes

## 2023-05-15 NOTE — BH CRISIS PLAN
Client Name: Justin Sue       Client YOB: 1980  : 1980    Treatment Team (include name and contact information):     Psychotherapist: Aaron Talavera MS, LPC  noPsychiatrist: MICKY   Release of information completed: no    : Through Bozena RAGLAND [facility])   Release of information completed: yes    Other (Specify Role): NA    Release of information completed: no    Other (Specify Role): NA   Release of information completed: no    Healthcare Provider  Yariel Thomas DO  [address] [facility] [address]  [facility] [address]    Type of Plan   * Child plans (children 15 yo and younger) must be completed and signed by the child's legal guardian   * Plans for all individuals 15 yo and above must be signed by the client  Plan Type: adolescent/adult (15 and over) Update/Review      My Personal Strengths are (in the client's own words):  Smart, articulate, beautiful, sing, vast knowledge of art and music, good friend, humanitarian,     The stressors and triggers that may put me at risk are:  other (describe) not seeing my children, not having access of any sort to my children, people invading my space or taking my things without asking, anything too sexual    Coping skills I can use to keep myself calm and safe: Other (describe) Singing, self-care    Coping skills/supports I can use to maintain abstinence from substance use:   Friends, therapy    The people that provide me with help and support: (Include name, contact, and how they can help)   Support person #1: Aaron Talavera MS, [facility]    * Phone number: [phone]    * How can they help me? Therapy     Support person #2: [facility]    * Phone number:     * How can they help me? Support     Support person #3: NA    * Phone number: NA    * How can they help me?  NA    In the past, the following has helped me in times of crisis:    Taking medications and Using de-escalation tools that I have learned      If it is an emergency and you need immediate help, call 9-1-1    If there is a possibility of danger to yourself or others, call the following crisis hotline resources:     Adult Crisis Numbers  Suicide Prevention Hotline - Dial 9-8-8  [facility]: [address]: [address]: [address]: [phone]  [facility] ([facility]): [phone]  [facility]: [address] [place] [address] Avenue: [address] [place] [address] St: [phone] (daytime)  [phone] (after hours, weekends, holidays)     Child/Adolescent Crisis Numbers   [facility]: [address]: [phone]   Spike Maciel: [phone]   [facility] ([facility]): [phone]    Please note: Some [facility] do not have a separate number for Child/Adolescent specific crisis  If your county is not listed under Child/Adolescent, please call the adult number for your county     National Talk to Text Line   [place] - [phone]    In the event your feelings become unmanageable, and you cannot reach your support system, you will call 911 immediately or go to the nearest hospital emergency room

## 2023-05-22 ENCOUNTER — TELEMEDICINE (OUTPATIENT)
Dept: BEHAVIORAL/MENTAL HEALTH CLINIC | Facility: CLINIC | Age: 43
End: 2023-05-22

## 2023-05-22 DIAGNOSIS — F41.1 GAD (GENERALIZED ANXIETY DISORDER): ICD-10-CM

## 2023-05-22 DIAGNOSIS — F33.2 MODERATELY SEVERE RECURRENT MAJOR DEPRESSION (HCC): Primary | ICD-10-CM

## 2023-05-22 NOTE — PSYCH
Behavioral Health Psychotherapy Progress Note    Psychotherapy Provided: Individual Psychotherapy     1  Moderately severe recurrent major depression ([address] )        2  DAYAMI (generalized anxiety disorder)            Goals addressed in session: Goal 1, Goal 2 and Goal 3      DATA: Client signed in late to session  Client shared that she has a herniated disk in her back, but stated that she is not open to surgery  Client was cursing very frequently throughout session  Client expressed feeling overwhelmed due to not having custody of her children  Therapist encouraged client to focus on what steps she can take now in order to work towards that goal  Client denied any plan, intent, or means to harm herself, and contracted for safety  Client appeared to be very angry throughout session, and appeared to want to vent to therapist  Client expressed her anger towards her ex-, Shila Suazo, and her feeling that he is trying to limit her access to their children  Client also expressed her anger towards José, whose house she is staying in  Therapist continued to encourage client to focus on what steps she could take now towards stability  During this session, this clinician used the following therapeutic modalities: Solution-Focused Therapy and Supportive Psychotherapy    Substance Abuse was not addressed during this session  If the client is diagnosed with a co-occurring substance use disorder, please indicate any changes in the frequency or amount of use: no change  Stage of change for addressing substance use diagnoses: Maintenance    ASSESSMENT:  Buck Espinoza presents with a Angry and Depressed mood  her affect is very intense and angry, which is congruent, with her mood and the content of the session  The client has not made progress on their goals  Buck Espinoza presents with a minimal risk of suicide, minimal risk of self-harm, and none risk of harm to others      For any risk assessment that surpasses a "\"low\" rating, a safety plan must be developed  A safety plan was indicated: no  If yes, describe in detail NA    PLAN: Between sessions, Magdiel Montero will use her coping skills  At the next session, the therapist will use Solution-Focused Therapy and Supportive Psychotherapy to address what steps she can take towards progress now  Behavioral Health Treatment Plan and Discharge Planning: Magdiel Montero is aware of and agrees to continue to work on their treatment plan  They have identified and are working toward their discharge goals  yes    Visit start and stop times:    05/22/23  Start Time: 1119  Stop Time: 1147  Total Visit Time: 28 minutes  Virtual Regular Visit    Verification of patient location:    Patient is located at Home in the following state in which I hold an active license [place]      Assessment/Plan:    Problem List Items Addressed This Visit        Other    Moderately severe recurrent major depression ([address] ) - Primary    DAYAMI (generalized anxiety disorder)       Goals addressed in session: Goal 1, Goal 2 and Goal 3           Reason for visit is No chief complaint on file  Encounter provider Cassy Arreguin [facility]    Provider located at [address] [place] [address]  [phone]      Recent Visits  Date Type Provider Dept   05/15/23 Telemedicine JOSE Taylor [address] Therapist [facility]   Showing recent visits within past 7 days and meeting all other requirements  Today's Visits  Date Type Provider Dept   05/22/23 Telemedicine JOSE Taylor [address] [facility]rap[facility]   Showing today's visits and meeting all other requirements  Future Appointments  No visits were found meeting these conditions  Showing future appointments within next 150 days and meeting all other requirements       The patient was identified by name and date of birth   Magdiel Montero was informed that this is " a telemedicine visit and that the visit is being conducted throughthe [employer] platform  She agrees to proceed     My office door was closed  No one else was in the room  She acknowledged consent and understanding of privacy and security of the video platform  The patient has agreed to participate and understands they can discontinue the visit at any time  Patient is aware this is a billable service  Subjective  Jaime Lam is a 39 y o  female    HPI     No past medical history on file  No past surgical history on file  No current outpatient medications on file  No current facility-administered medications for this visit  No Known Allergies    Review of Systems    Video Exam    There were no vitals filed for this visit      Physical Exam     05/22/23  Start Time: 5236  Stop Time: 3145  Total Visit Time: 28 minutes

## 2023-06-05 ENCOUNTER — TELEMEDICINE (OUTPATIENT)
Dept: BEHAVIORAL/MENTAL HEALTH CLINIC | Facility: CLINIC | Age: 43
End: 2023-06-05
Payer: COMMERCIAL

## 2023-06-05 DIAGNOSIS — F33.2 MODERATELY SEVERE RECURRENT MAJOR DEPRESSION (HCC): Primary | ICD-10-CM

## 2023-06-05 DIAGNOSIS — F41.1 GAD (GENERALIZED ANXIETY DISORDER): ICD-10-CM

## 2023-06-05 DIAGNOSIS — F43.10 POST TRAUMATIC STRESS DISORDER (PTSD): ICD-10-CM

## 2023-06-05 PROCEDURE — 90832 PSYTX W PT 30 MINUTES: CPT | Performed by: COUNSELOR

## 2023-06-05 NOTE — PSYCH
"Behavioral Health Psychotherapy Progress Note    Psychotherapy Provided: Individual Psychotherapy     1  Moderately severe recurrent major depression ([address] )        2  DAYAMI (generalized anxiety disorder)        3  Post traumatic stress disorder (PTSD)            Goals addressed in session: Goal 1, Goal 2 and Goal 3      DATA: Client continued to state that she was angry that therapist would not force her son, Will, to speak to her, and that this means that no one wants to help her  Therapist again reiterated the law and HIPAA, and reflected back to client that she is frustrated, and that it must be very difficult to not have contact with one of her children  Client continued to state that it is not her fault, and that she did not do anything wrong to have her son not want to speak to her  Client contracted for safety  During this session, this clinician used the following therapeutic modalities: Cognitive Behavioral Therapy and Family Therapy    Substance Abuse was not addressed during this session  If the client is diagnosed with a co-occurring substance use disorder, please indicate any changes in the frequency or amount of use: no change  Stage of change for addressing substance use diagnoses: Maintenance    ASSESSMENT:  Izzy Magallon presents with a Angry, Anxious and Depressed mood  her affect is Tearful and intense, which is congruent, with her mood and the content of the session  The client has not made progress on their goals  Izzy Magallon presents with a minimal risk of suicide, minimal risk of self-harm, and none risk of harm to others  For any risk assessment that surpasses a \"low\" rating, a safety plan must be developed  A safety plan was indicated: no  If yes, describe in detail NA    PLAN: Between sessions, Izzy Magallon will use her coping skills   At the next session, the therapist will use Cognitive Behavioral Therapy, Family Therapy and Supportive Psychotherapy to address client's " relationships  Behavioral Health Treatment Plan and Discharge Planning: Kelin King is aware of and agrees to continue to work on their treatment plan  They have identified and are working toward their discharge goals  yes    Visit start and stop times:    06/05/23  Start Time: 1115  Stop Time: 1145  Total Visit Time: 30 minutes  Virtual Regular Visit    Verification of patient location:    Patient is located at Home in the following state in which I hold an active license [place]      Assessment/Plan:    Problem List Items Addressed This Visit        Other    Moderately severe recurrent major depression ([address] ) - Primary    DAYAMI (generalized anxiety disorder)    Post traumatic stress disorder (PTSD)       Goals addressed in session: Goal 1, Goal 2 and Goal 3           Reason for visit is No chief complaint on file  Encounter provider Moraima Davis [facility]    Provider located at [address] [place] [address]  [phone]      Recent Visits  No visits were found meeting these conditions  Showing recent visits within past 7 days and meeting all other requirements  Today's Visits  Date Type Provider Dept   06/05/23 Telemedicine Moraima Davis, [address] [place] [address] Therapist [facility]   Showing today's visits and meeting all other requirements  Future Appointments  No visits were found meeting these conditions  Showing future appointments within next 150 days and meeting all other requirements       The patient was identified by name and date of birth  Kelin King was informed that this is a telemedicine visit and that the visit is being conducted throughthe [employer] platform  She agrees to proceed     My office door was closed  No one else was in the room  She acknowledged consent and understanding of privacy and security of the video platform   The patient has agreed to participate and understands they can discontinue the visit at any time  Patient is aware this is a billable service  Subjective  Viet Rascon is a 39 y o  female    HPI     No past medical history on file  No past surgical history on file  No current outpatient medications on file  No current facility-administered medications for this visit  No Known Allergies    Review of Systems    Video Exam    There were no vitals filed for this visit      Physical Exam     06/05/23  Start Time: 2234  Stop Time: 4691  Total Visit Time: 30 minutes

## 2023-06-12 ENCOUNTER — TELEMEDICINE (OUTPATIENT)
Dept: BEHAVIORAL/MENTAL HEALTH CLINIC | Facility: CLINIC | Age: 43
End: 2023-06-12
Payer: COMMERCIAL

## 2023-06-12 DIAGNOSIS — F33.2 MODERATELY SEVERE RECURRENT MAJOR DEPRESSION (HCC): Primary | ICD-10-CM

## 2023-06-12 PROCEDURE — 90832 PSYTX W PT 30 MINUTES: CPT | Performed by: COUNSELOR

## 2023-06-12 NOTE — PSYCH
"Behavioral Health Psychotherapy Progress Note    Psychotherapy Provided: Individual Psychotherapy     1  Moderately severe recurrent major depression ([address] )            Goals addressed in session: Goal 3      DATA: Client shared that she has 10+ herniated discs in her back, and that she is running low on her medical marijuana  Therapist and client discussed how her back pain impacts her day to day life  Therapist and client continued to process client's relationship with her daughter, Alina Patrick  Client stated that Catalina passes gas when client is on the phone with a man, and when asked why, Catalina stated \"he doesn't deserve you  \" Client stated that this was \"the cutest thing she'd ever heard,\" but therapist pointed out that this may indicate a lack of trust and comfort with client's dating life, which client denied  Therapist and client also discussed client's attempts to make money, and why she does not feel that she can have a full-time job  During this session, this clinician used the following therapeutic modalities: Cognitive Behavioral Therapy, Family Therapy and Supportive Psychotherapy    Substance Abuse was not addressed during this session  If the client is diagnosed with a co-occurring substance use disorder, please indicate any changes in the frequency or amount of use: no change  Stage of change for addressing substance use diagnoses: Pre-contemplation    ASSESSMENT:  Greg Austin presents with a Euthymic/ normal mood  her affect is Normal range and intensity, which is congruent, with her mood and the content of the session  The client has made progress on their goals  Greg Austin presents with a none risk of suicide, none risk of self-harm, and none risk of harm to others  For any risk assessment that surpasses a \"low\" rating, a safety plan must be developed  A safety plan was indicated: no  If yes, describe in detail NA    PLAN: Between sessions, Greg Austin will use her coping skills   " At the next session, the therapist will use Cognitive Behavioral Therapy to address relationships  Behavioral Health Treatment Plan and Discharge Planning: Jacqueline Duncan is aware of and agrees to continue to work on their treatment plan  They have identified and are working toward their discharge goals  yes    Visit start and stop times:    06/12/23  Start Time: 1113  Stop Time: 1145  Total Visit Time: 32 minutes  Virtual Regular Visit    Verification of patient location:    Patient is located at Home in the following state in which I hold an active license [place]      Assessment/Plan:    Problem List Items Addressed This Visit        Other    Moderately severe recurrent major depression ([facility] [address] ) - Primary       Goals addressed in session: Goal 3           Reason for visit is No chief complaint on file  Encounter provider Pérez Hawthorne, [facility]    Provider located at [address] [place] [address]  [phone]      Recent Visits  Date Type Provider Dept   06/05/23 JOSE [address] [employer] [facility]   Showing recent visits within past 7 days and meeting all other requirements  Today's Visits  Date Type Provider Dept   06/12/23 Telemedicine Erum Menezes[address] [employer] [facility]   Showing today's visits and meeting all other requirements  Future Appointments  No visits were found meeting these conditions  Showing future appointments within next 150 days and meeting all other requirements       The patient was identified by name and date of birth  Jacqueline Duncan was informed that this is a telemedicine visit and that the visit is being conducted throughthe [employer] platform  She agrees to proceed     My office door was closed  No one else was in the room  She acknowledged consent and understanding of privacy and security of the video platform   The patient has agreed to participate and understands they can discontinue the visit at any time  Patient is aware this is a billable service  Subjective  Don Valdez is a 39 y o  female    HPI     No past medical history on file  No past surgical history on file  No current outpatient medications on file  No current facility-administered medications for this visit  No Known Allergies    Review of Systems    Video Exam    There were no vitals filed for this visit      Physical Exam     06/12/23  Start Time: 3092  Stop Time: 2871  Total Visit Time: 32 minutes

## 2023-06-19 ENCOUNTER — TELEMEDICINE (OUTPATIENT)
Dept: BEHAVIORAL/MENTAL HEALTH CLINIC | Facility: CLINIC | Age: 43
End: 2023-06-19
Payer: COMMERCIAL

## 2023-06-19 DIAGNOSIS — F33.2 MODERATELY SEVERE RECURRENT MAJOR DEPRESSION (HCC): Primary | ICD-10-CM

## 2023-06-19 DIAGNOSIS — F43.10 POST TRAUMATIC STRESS DISORDER (PTSD): ICD-10-CM

## 2023-06-19 PROCEDURE — 90834 PSYTX W PT 45 MINUTES: CPT | Performed by: COUNSELOR
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
## 2023-07-03 ENCOUNTER — TELEMEDICINE (OUTPATIENT)
Dept: BEHAVIORAL/MENTAL HEALTH CLINIC | Facility: CLINIC | Age: 43
End: 2023-07-03
Payer: COMMERCIAL

## 2023-07-03 DIAGNOSIS — F33.2 MODERATELY SEVERE RECURRENT MAJOR DEPRESSION (HCC): ICD-10-CM

## 2023-07-03 DIAGNOSIS — F43.10 POST TRAUMATIC STRESS DISORDER (PTSD): Primary | ICD-10-CM

## 2023-07-03 PROCEDURE — 90834 PSYTX W PT 45 MINUTES: CPT | Performed by: COUNSELOR

## 2023-07-03 NOTE — PSYCH
Behavioral Health Psychotherapy Progress Note    Psychotherapy Provided: Individual Psychotherapy     1. Post traumatic stress disorder (PTSD)        2. Moderately severe recurrent major depression ([address])            Goals addressed in session: Goal 3      DATA: Client shared that her mother had fixed her car for her, which client acknowledged was a good surprise, but when therapist tried to have client focus on the positive which was that her mother helped her, client began saying that her mother "owed (her)" for how she has treated her. Client then stated that she learned that she had 2 outstanding tickets and thus a warrant out for her arrest, but that she had contacted the police and that there was no longer a warrant out for her. Therapist and client continued to process client's family dynamics, as well as her current living situation. Client shared that she began dating her friend, Cb Chavez, who is autistic. Client said she likes that he has ASD because "it means he can't lie," which therapist contradicted. During this session, this clinician used the following therapeutic modalities: Cognitive Behavioral Therapy and Supportive Psychotherapy    Substance Abuse was addressed during this session. If the client is diagnosed with a co-occurring substance use disorder, please indicate any changes in the frequency or amount of use: no change. Stage of change for addressing substance use diagnoses: Maintenance    ASSESSMENT:  Félix Hernandez presents with a Euthymic/ normal and Angry mood. her affect is intense, which is congruent, with her mood and the content of the session. The client has not made progress on their goals. Félix Hernandez presents with a minimal risk of suicide, none risk of self-harm, and none risk of harm to others. For any risk assessment that surpasses a "low" rating, a safety plan must be developed.     A safety plan was indicated: no  If yes, describe in detail NA    PLAN: Between sessions, Juan Gutierrez will use her coping skills. At the next session, the therapist will use Cognitive Behavioral Therapy, Solution-Focused Therapy and Supportive Psychotherapy to address steps she can take to make progress. Behavioral Health Treatment Plan and Discharge Planning: Juan Gutierrez is aware of and agrees to continue to work on their treatment plan. They have identified and are working toward their discharge goals. yes    Visit start and stop times:    07/03/23  Start Time: 1106  Stop Time: 1146  Total Visit Time: 40 minutes  Virtual Regular Visit    Verification of patient location:    Patient is located at Home in the following state in which I hold an active license [place]      Assessment/Plan:    Problem List Items Addressed This Visit        Other    Moderately severe recurrent major depression ([address])    Post traumatic stress disorder (PTSD) - Primary       Goals addressed in session: Goal 3           Reason for visit is No chief complaint on file. Encounter provider Octaviano Boeck, [facility]    Provider located at [address] [place] [address]  [phone]      Recent Visits  No visits were found meeting these conditions. Showing recent visits within past 7 days and meeting all other requirements  Today's Visits  Date Type Provider Dept   07/03/23 Telemedicine Octaviano Boeck, [address] [place] [address] Therapist [facility]   Showing today's visits and meeting all other requirements  Future Appointments  No visits were found meeting these conditions. Showing future appointments within next 150 days and meeting all other requirements       The patient was identified by name and date of birth. Juan Gutierrez was informed that this is a telemedicine visit and that the visit is being conducted throughthe [employer] platform. She agrees to proceed. .  My office door was closed. No one else was in the room.   She acknowledged consent and understanding of privacy and security of the video platform. The patient has agreed to participate and understands they can discontinue the visit at any time. Patient is aware this is a billable service. Subjective  Rony Fernandes is a 39 y.o. female  . HPI     No past medical history on file. No past surgical history on file. No current outpatient medications on file. No current facility-administered medications for this visit. No Known Allergies    Review of Systems    Video Exam    There were no vitals filed for this visit.     Physical Exam     07/03/23  Start Time: 1106  Stop Time: 1146  Total Visit Time: 40 minutes

## 2023-07-10 ENCOUNTER — TELEMEDICINE (OUTPATIENT)
Dept: BEHAVIORAL/MENTAL HEALTH CLINIC | Facility: CLINIC | Age: 43
End: 2023-07-10
Payer: COMMERCIAL

## 2023-07-10 DIAGNOSIS — F33.2 MODERATELY SEVERE RECURRENT MAJOR DEPRESSION (HCC): Primary | ICD-10-CM

## 2023-07-10 PROCEDURE — 90834 PSYTX W PT 45 MINUTES: CPT | Performed by: COUNSELOR

## 2023-07-10 NOTE — PSYCH
Behavioral Health Psychotherapy Progress Note    Psychotherapy Provided: Individual Psychotherapy     1. Moderately severe recurrent major depression ([address])            Goals addressed in session: Goal 1 and Goal 3      DATA: Client shared that she had spoken with her ex- about asking her son, Vitor, to spend time with her out in his yard. Therapist challenged client, as Will has set a boundary of not wanting to speak or spend time with her. Client stated that she is respecting [address] [address] boundary because she is not contacting him directly, but rather through his father. Client stated that she "did nothing wrong" to cause Will to not want to speak to her. Client again expressed her frustration that [employer] will not force Will to engage in family therapy. Client stated that prior to [facility], that she and Will were "good."     During this session, this clinician used the following therapeutic modalities: Cognitive Behavioral Therapy, Family Therapy, Solution-Focused Therapy and Supportive Psychotherapy    Substance Abuse was not addressed during this session. If the client is diagnosed with a co-occurring substance use disorder, please indicate any changes in the frequency or amount of use: no change. Stage of change for addressing substance use diagnoses: Maintenance    ASSESSMENT:  Adal Colon presents with a Euthymic/ normal, Angry and Depressed mood. her affect is Normal range and intensity, which is congruent, with her mood and the content of the session. The client has made progress on their goals. Adal Colon presents with a minimal risk of suicide, minimal risk of self-harm, and none risk of harm to others. For any risk assessment that surpasses a "low" rating, a safety plan must be developed. A safety plan was indicated: no  If yes, describe in detail NA    PLAN: Between sessions, Adal Colon will use her coping skills.  At the next session, the therapist will use Cognitive Behavioral Therapy and Family Therapy to address various relationship dynamics. Behavioral Health Treatment Plan and Discharge Planning: Matty Lemus is aware of and agrees to continue to work on their treatment plan. They have identified and are working toward their discharge goals. yes    Visit start and stop times:    07/10/23  Start Time: 1104  Stop Time: 1147  Total Visit Time: 43 minutes  Virtual Regular Visit    Verification of patient location:    Patient is located at Home in the following state in which I hold an active license [place]      Assessment/Plan:    Problem List Items Addressed This Visit        Other    Moderately severe recurrent major depression ([address]) - Primary       Goals addressed in session: Goal 1 and Goal 3           Reason for visit is No chief complaint on file. Encounter provider [facility]    Provider located at [address] [place] [address]  [phone]      Recent Visits  Date Type Provider Dept   07/03/23 Telemedicine [facility], [address] [place] [address] Therapist [facility]   Showing recent visits within past 7 days and meeting all other requirements  Today's Visits  Date Type Provider Dept   07/10/23 Telemedicine [facility], [address] [place] [address] Therapist [facility]   Showing today's visits and meeting all other requirements  Future Appointments  No visits were found meeting these conditions. Showing future appointments within next 150 days and meeting all other requirements       The patient was identified by name and date of birth. Matty Lemus was informed that this is a telemedicine visit and that the visit is being conducted throughthe [employer] platform. She agrees to proceed. .  My office door was closed. No one else was in the room. She acknowledged consent and understanding of privacy and security of the video platform.  The patient has agreed to participate and understands they can discontinue the visit at any time. Patient is aware this is a billable service. Subjective  eLonel Lopez is a 39 y.o. female  . HPI     No past medical history on file. No past surgical history on file. No current outpatient medications on file. No current facility-administered medications for this visit. No Known Allergies    Review of Systems    Video Exam    There were no vitals filed for this visit.     Physical Exam     07/10/23  Start Time: 1104  Stop Time: 2300  Total Visit Time: 43 minutes

## 2023-07-17 ENCOUNTER — TELEPHONE (OUTPATIENT)
Dept: PSYCHIATRY | Facility: CLINIC | Age: 43
End: 2023-07-17

## 2023-07-17 ENCOUNTER — TELEMEDICINE (OUTPATIENT)
Dept: BEHAVIORAL/MENTAL HEALTH CLINIC | Facility: CLINIC | Age: 43
End: 2023-07-17
Payer: COMMERCIAL

## 2023-07-17 DIAGNOSIS — F33.2 MODERATELY SEVERE RECURRENT MAJOR DEPRESSION (HCC): Primary | ICD-10-CM

## 2023-07-17 DIAGNOSIS — F43.10 POST TRAUMATIC STRESS DISORDER (PTSD): ICD-10-CM

## 2023-07-17 PROCEDURE — 90834 PSYTX W PT 45 MINUTES: CPT | Performed by: COUNSELOR

## 2023-07-18 NOTE — PSYCH
Behavioral Health Psychotherapy Progress Note    Psychotherapy Provided: Individual Psychotherapy     1. Moderately severe recurrent major depression ([address])        2. Post traumatic stress disorder (PTSD)            Goals addressed in session: Goal 3      DATA: Client shared that she and her BCM, Jenny Fragoso, had worked on some of the life stressors that client has been experiencing recently, such as her court matters (unpaid tickets), which therapist verbally reinforced. Client shared that she has a business plan, but did not want to share it with therapist due to feeling as though it may not then come to fruition. Client shared that she has "big dreams" that she needs to follow. Therapist encouraged client to focus on building a stable foundation as well. Client continued to share information about her trauma hx, focusing on that of her childhood. During this session, this clinician used the following therapeutic modalities: Engagement Strategies, Cognitive Processing Therapy, Solution-Focused Therapy and Supportive Psychotherapy    Substance Abuse was not addressed during this session. If the client is diagnosed with a co-occurring substance use disorder, please indicate any changes in the frequency or amount of use: no change. Stage of change for addressing substance use diagnoses: Maintenance    ASSESSMENT:  Gary Cooks presents with a Angry, Anxious and Depressed mood. her affect is intense, which is congruent, with her mood and the content of the session. The client has made progress on their goals. Gary Cooks presents with a minimal risk of suicide, minimal risk of self-harm, and none risk of harm to others. For any risk assessment that surpasses a "low" rating, a safety plan must be developed. A safety plan was indicated: no  If yes, describe in detail NA    PLAN: Between sessions, Gary Cooks will continue to work with her [facility].  At the next session, the therapist will use Solution-Focused Therapy to address steps client can take towards progress. Behavioral Health Treatment Plan and Discharge Planning: Mali Turcios is aware of and agrees to continue to work on their treatment plan. They have identified and are working toward their discharge goals. yes    Visit start and stop times:    07/17/23  Start Time: 1107  Stop Time: 1148  Total Visit Time: 41 minutes  Virtual Regular Visit    Verification of patient location:    Patient is located at Home in the following state in which I hold an active license [place]      Assessment/Plan:    Problem List Items Addressed This Visit        Other    Moderately severe recurrent major depression ([address]) - Primary    Post traumatic stress disorder (PTSD)       Goals addressed in session: Goal 3           Reason for visit is No chief complaint on file. Encounter provider Gil Tavarez, [facility]    Provider located at [address] [place] [address]  [address] [place] [address]  [phone]      Recent Visits  Date Type Provider Dept   07/17/23 Telemedicine Gil Tavarez, [address] [place] [address] Therapist [facility]   Showing recent visits within past 7 days and meeting all other requirements  Future Appointments  No visits were found meeting these conditions. Showing future appointments within next 150 days and meeting all other requirements       The patient was identified by name and date of birth. Mali Turcios was informed that this is a telemedicine visit and that the visit is being conducted throughthe [employer] platform. She agrees to proceed. .  My office door was closed. No one else was in the room. She acknowledged consent and understanding of privacy and security of the video platform. The patient has agreed to participate and understands they can discontinue the visit at any time. Patient is aware this is a billable service. Subjective  Mali Turcios is a 39 y. o. female  . HPI     No past medical history on file. No past surgical history on file. No current outpatient medications on file. No current facility-administered medications for this visit. No Known Allergies    Review of Systems    Video Exam    There were no vitals filed for this visit.     Physical Exam     07/17/23  Start Time: 1107  Stop Time: 9319  Total Visit Time: 41 minutes

## 2023-07-24 ENCOUNTER — TELEMEDICINE (OUTPATIENT)
Dept: BEHAVIORAL/MENTAL HEALTH CLINIC | Facility: CLINIC | Age: 43
End: 2023-07-24
Payer: COMMERCIAL

## 2023-07-24 DIAGNOSIS — F43.10 POST TRAUMATIC STRESS DISORDER (PTSD): Primary | ICD-10-CM

## 2023-07-24 DIAGNOSIS — F33.2 MODERATELY SEVERE RECURRENT MAJOR DEPRESSION (HCC): ICD-10-CM

## 2023-07-24 PROCEDURE — 90834 PSYTX W PT 45 MINUTES: CPT | Performed by: COUNSELOR

## 2023-07-25 NOTE — PSYCH
Behavioral Health Psychotherapy Progress Note    Psychotherapy Provided: Individual Psychotherapy     1. Post traumatic stress disorder (PTSD)        2. Moderately severe recurrent major depression ([address])            Goals addressed in session: Goal 3      DATA: Client again stated that she wants to switch her services to [facility] because she is angry that the therapists at [facility] will not force client's son to speak to her. Therapist reminded client that all therapists have to follow the law, and that no one can force Will to speak to her and that due to his age, he is able to make decisions regarding treatment. Client would state that she understood, but then again state her frustration with [facility] due to this issue. Client again stated that Will has "no good reason" to not speak to her, which therapist challenged. Client shared her ongoing frustration with her roommate, and stated that she had slept next to her unloaded crossbow in order to encourage her roommate to leave her alone. During this session, this clinician used the following therapeutic modalities: Family Therapy and Supportive Psychotherapy    Substance Abuse was not addressed during this session. If the client is diagnosed with a co-occurring substance use disorder, please indicate any changes in the frequency or amount of use: no change. Stage of change for addressing substance use diagnoses: Maintenance    ASSESSMENT:  Davis Chau presents with a Euthymic/ normal, Angry and Depressed mood. her affect is intense, which is congruent, with her mood and the content of the session. The client has not made progress on their goals. Davis Chau presents with a none risk of suicide, none risk of self-harm, and none risk of harm to others. For any risk assessment that surpasses a "low" rating, a safety plan must be developed.     A safety plan was indicated: no  If yes, describe in detail NA    PLAN: Between sessions, Nelson Chavez will use her coping skills. At the next session, the therapist will use Cognitive Behavioral Therapy and Family Therapy to address relationships. Behavioral Health Treatment Plan and Discharge Planning: Nelson Chavez is aware of and agrees to continue to work on their treatment plan. They have identified and are working toward their discharge goals. yes    Visit start and stop times:    07/24/23  Start Time: 1108  Stop Time: 1152  Total Visit Time: 44 minutes  Virtual Regular Visit    Verification of patient location:    Patient is located at Home in the following state in which I hold an active license [place]      Assessment/Plan:    Problem List Items Addressed This Visit        Other    Moderately severe recurrent major depression ([address] [facility])    Post traumatic stress disorder (PTSD) - Primary       Goals addressed in session: Goal 3           Reason for visit is No chief complaint on file. Encounter provider Yo Marquez, [facility]    Provider located at [address] [place] [address]  [address] [place] [address]  [phone]      Recent Visits  Date Type Provider Dept   07/24/23 2615 [facility], [address] Therapist [facility]   Showing recent visits within past 7 days and meeting all other requirements  Future Appointments  No visits were found meeting these conditions. Showing future appointments within next 150 days and meeting all other requirements       The patient was identified by name and date of birth. Nelson Chavez was informed that this is a telemedicine visit and that the visit is being conducted throughthe [employer] platform. She agrees to proceed. .  My office door was closed. No one else was in the room. She acknowledged consent and understanding of privacy and security of the video platform.  The patient has agreed to participate and understands they can discontinue the visit at any time. Patient is aware this is a billable service. Ori Turcios is a 39 y.o. female  . HPI     No past medical history on file. No past surgical history on file. No current outpatient medications on file. No current facility-administered medications for this visit. No Known Allergies    Review of Systems    Video Exam    There were no vitals filed for this visit.     Physical Exam     07/24/23  Start Time: 0928  Stop Time: 6184  Total Visit Time: 44 minutes

## 2023-07-31 ENCOUNTER — TELEMEDICINE (OUTPATIENT)
Dept: BEHAVIORAL/MENTAL HEALTH CLINIC | Facility: CLINIC | Age: 43
End: 2023-07-31
Payer: COMMERCIAL

## 2023-07-31 DIAGNOSIS — F33.2 MODERATELY SEVERE RECURRENT MAJOR DEPRESSION (HCC): Primary | ICD-10-CM

## 2023-07-31 PROCEDURE — 90834 PSYTX W PT 45 MINUTES: CPT | Performed by: COUNSELOR

## 2023-07-31 NOTE — PSYCH
Behavioral Health Psychotherapy Progress Note    Psychotherapy Provided: Individual Psychotherapy     1. Moderately severe recurrent major depression ([address])            Goals addressed in session: Goal 3      DATA: Therapist and client continued to process client's relationship with her 2 children, Vitor and Catalina. Client stated that she is experiencing "parental alienation." Client shared that while he has had Catalina for the past 5 or 6 days, that she feels Catalina has pulled away and is unsure of why that is. 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 Therapist and client discussed what the first step is that client feels that she needs to take in order to work towards her goals. During this session, this clinician used the following therapeutic modalities: Engagement Strategies, Family Therapy and Supportive Psychotherapy    Substance Abuse was not addressed during this session. If the client is diagnosed with a co-occurring substance use disorder, please indicate any changes in the frequency or amount of use: NA. Stage of change for addressing substance use diagnoses: Maintenance    ASSESSMENT:  John Dsouza presents with a Euthymic/ normal and Anxious mood. her affect is Normal range and intensity, which is congruent, with her mood and the content of the session. The client has made progress on their goals. John Dsouza presents with a none risk of suicide, none risk of self-harm, and none risk of harm to others. For any risk assessment that surpasses a "low" rating, a safety plan must be developed. A safety plan was indicated: no  If yes, describe in detail NA    PLAN: Between sessions, John Dsouza will use her coping skills.  At the next session, the therapist will use Engagement Strategies and Supportive Psychotherapy to address building therapeutic rapport. Behavioral Health Treatment Plan and Discharge Planning: Rony Fernandes is aware of and agrees to continue to work on their treatment plan. They have identified and are working toward their discharge goals. yes    Visit start and stop times:    07/31/23  Start Time: 1109  Stop Time: 1152  Total Visit Time: 43 minutes  Virtual Regular Visit    Verification of patient location:    Patient is located at Home in the following state in which I hold an active license [place]      Assessment/Plan:    Problem List Items Addressed This Visit        Other    Moderately severe recurrent major depression ([address]) - Primary       Goals addressed in session: Goal 3           Reason for visit is No chief complaint on file. Encounter provider Ty Peng [facility]    Provider located at [address] [place] [address]  [address] [place] [address]  [phone]      Recent Visits  Date Type Provider Dept   07/24/23 Telemedicine Ty Peng, [address] [place] [address] Therapist [facility]   Showing recent visits within past 7 days and meeting all other requirements  Today's Visits  Date Type Provider Dept   [facility] [address] [place] [address] Therapist [facility]   Showing today's visits and meeting all other requirements  Future Appointments  No visits were found meeting these conditions. Showing future appointments within next 150 days and meeting all other requirements       The patient was identified by name and date of birth. Rony Fernandes was informed that this is a telemedicine visit and that the visit is being conducted throughthe OneDoc platform. She agrees to proceed. .  My office door was closed. No one else was in the room. She acknowledged consent and understanding of privacy and security of the video platform. The patient has agreed to participate and understands they can discontinue the visit at any time.     Patient is aware this is a billable service. Subjective  Nelson Chavez is a 39 y.o. female  . HPI     No past medical history on file. No past surgical history on file. No current outpatient medications on file. No current facility-administered medications for this visit. No Known Allergies    Review of Systems    Video Exam    There were no vitals filed for this visit.     Physical Exam     07/31/23  Start Time: 1400  Stop Time: 2681  Total Visit Time: 43 minutes

## 2023-08-07 PROBLEM — Z91.199 NO-SHOW FOR APPOINTMENT: Status: ACTIVE | Noted: 2023-08-07

## 2023-08-14 ENCOUNTER — TELEMEDICINE (OUTPATIENT)
Dept: BEHAVIORAL/MENTAL HEALTH CLINIC | Facility: CLINIC | Age: 43
End: 2023-08-14
Payer: COMMERCIAL

## 2023-08-14 DIAGNOSIS — F43.10 POST TRAUMATIC STRESS DISORDER (PTSD): Primary | ICD-10-CM

## 2023-08-14 PROCEDURE — 90834 PSYTX W PT 45 MINUTES: CPT | Performed by: COUNSELOR

## 2023-08-14 NOTE — PSYCH
Behavioral Health Psychotherapy Progress Note    Psychotherapy Provided: Individual Psychotherapy     1. Post traumatic stress disorder (PTSD)            Goals addressed in session: Goal 3      DATA: Client shared that she will be beginning therapy at [facility] due to her other services being through there and because [facility] could not force client's son to meet with her. Therapist and client continued to process client's relationship with her children. Client stated that the only reason her son, Will, will not speak to her is because of the incident that occurred on [place] where client was hospitalized, and because she does not have a house, car, and job. Client does not appear to take any responsibility for the [place] incident. Therapist pointed to her lack of a house, car, and job, as well as the [place] incident, as client having a lack of stability to provide her children, which client agreed with. During this session, this clinician used the following therapeutic modalities: Engagement Strategies, Cognitive Behavioral Therapy, Family Therapy, Solution-Focused Therapy and Supportive Psychotherapy    Substance Abuse was not addressed during this session. If the client is diagnosed with a co-occurring substance use disorder, please indicate any changes in the frequency or amount of use: no change. Stage of change for addressing substance use diagnoses: Maintenance    ASSESSMENT:  Davis Chau presents with a Euthymic/ normal mood. her affect is Normal range and intensity, which is congruent, with her mood and the content of the session. The client has made progress on their goals. Davis Chau presents with a none risk of suicide, none risk of self-harm, and none risk of harm to others. For any risk assessment that surpasses a "low" rating, a safety plan must be developed.     A safety plan was indicated: no  If yes, describe in detail NA    PLAN: This was client and therapist's last session due to client beginning therapy at [facility]. Behavioral Health Treatment Plan and Discharge Planning: Julia Lin is aware of and agrees to continue to work on their treatment plan. They have identified and are working toward their discharge goals. yes    Visit start and stop times:    08/14/23  Start Time: 1103  Stop Time: 1146  Total Visit Time: 43 minutes  Virtual Regular Visit    Verification of patient location:    Patient is located at Home in the following state in which I hold an active license [place]      Assessment/Plan:    Problem List Items Addressed This Visit        Other    Post traumatic stress disorder (PTSD) - Primary       Goals addressed in session: Goal 3           Reason for visit is No chief complaint on file. Encounter provider Marlys Eduardo, [facility]    Provider located at [address] [place] [address]  [address] [place] [address]  [phone]      Recent Visits  No visits were found meeting these conditions. Showing recent visits within past 7 days and meeting all other requirements  Today's Visits  Date Type Provider Dept   08/14/23 Telemedicine Marlys Eduardo, [address] [place], [place] [address]op   Showing today's visits and meeting all other requirements  Future Appointments  No visits were found meeting these conditions. Showing future appointments within next 150 days and meeting all other requirements       The patient was identified by name and date of birth. Julia Lin was informed that this is a telemedicine visit and that the visit is being conducted throughthe [employer] platform. She agrees to proceed. .  My office door was closed. No one else was in the room. She acknowledged consent and understanding of privacy and security of the video platform. The patient has agreed to participate and understands they can discontinue the visit at any time. Patient is aware this is a billable service. Ori Aguilar is a 39 y.o. female  . HPI     No past medical history on file. No past surgical history on file. No current outpatient medications on file. No current facility-administered medications for this visit. No Known Allergies    Review of Systems    Video Exam    There were no vitals filed for this visit.     Physical Exam     08/14/23  Start Time: 3577  Stop Time: 1146  Total Visit Time: 43 minutes

## 2023-09-12 ENCOUNTER — DOCUMENTATION (OUTPATIENT)
Dept: PSYCHIATRY | Facility: CLINIC | Age: 43
End: 2023-09-12

## 2023-09-12 NOTE — PSYCH
Psychiatric Discharge Summary     Admit Date: 9/22/2022  Discharge Date: 9/12/2023    Discharge Diagnosis: Major depressive disorder, recurrent, severe    Treating Physician: Deb Kim      Presenting Problems/Pertinent Findings:     Depression, anxiety and PTSD      Course of Treatment:  Patient was in treatment with this provider. Pt presented to the above provider with symptoms of depression, anxiety and PTSD for medication management. During her initial eval, she refused to take any psychotropic medications. She was open to therapy sessions and she was on a wait list to see a therapist as SLPF. Criteria for Discharge: Have completed tx goals and objectives and are no longer in need of services    Aftercare Recommendations: Follow up with therapist    Discharge Medications: No current outpatient medications on file. Mental Status at Time of most recent visit on Stable and risk to self or others was observed.

## 2024-01-30 ENCOUNTER — HOSPITAL ENCOUNTER (OUTPATIENT)
Dept: HOSPITAL 99 - HWLAB | Age: 44
End: 2024-01-30
Payer: COMMERCIAL

## 2024-01-30 DIAGNOSIS — Z87.39: ICD-10-CM

## 2024-01-30 DIAGNOSIS — M54.50: ICD-10-CM

## 2024-01-30 DIAGNOSIS — R10.13: ICD-10-CM

## 2024-01-30 DIAGNOSIS — K92.1: Primary | ICD-10-CM

## 2024-01-30 LAB
ALBUMIN SERPL-MCNC: 4.2 G/DL (ref 3.5–5)
ALP SERPL-CCNC: 75 U/L (ref 38–126)
ALT SERPL-CCNC: 36 U/L (ref 0–35)
AST SERPL-CCNC: 25 U/L (ref 14–36)
BUN SERPL-MCNC: 16 MG/DL (ref 7–17)
CALCIUM SERPL-MCNC: 9.5 MG/DL (ref 8.4–10.2)
CHLORIDE SERPL-SCNC: 103 MMOL/L (ref 98–107)
CO2 SERPL-SCNC: 28 MMOL/L (ref 22–30)
EGFR: > 60
ERYTHROCYTE [DISTWIDTH] IN BLOOD BY AUTOMATED COUNT: 13.2 % (ref 11.5–14.5)
GLUCOSE SERPL-MCNC: 97 MG/DL (ref 70–99)
HCT VFR BLD AUTO: 44.8 % (ref 37–47)
HGB BLD-MCNC: 14.9 G/DL (ref 12–16)
MCHC RBC AUTO-ENTMCNC: 33.3 G/DL (ref 33–37)
MCV RBC AUTO: 87.7 FL (ref 81–99)
NRBC BLD AUTO-RTO: 0 %
PLATELET # BLD AUTO: 305 10^3/UL (ref 130–400)
POTASSIUM SERPL-SCNC: 4.6 MMOL/L (ref 3.5–5.1)
PROT SERPL-MCNC: 7.3 G/DL (ref 6.3–8.2)
SODIUM SERPL-SCNC: 135 MMOL/L (ref 135–145)

## 2024-02-08 ENCOUNTER — HOSPITAL ENCOUNTER (OUTPATIENT)
Dept: HOSPITAL 99 - GI | Age: 44
End: 2024-02-08
Payer: COMMERCIAL

## 2024-02-08 DIAGNOSIS — K29.50: ICD-10-CM

## 2024-02-08 DIAGNOSIS — K22.89: ICD-10-CM

## 2024-02-08 DIAGNOSIS — K31.A11: ICD-10-CM

## 2024-02-08 DIAGNOSIS — K92.1: ICD-10-CM

## 2024-02-08 DIAGNOSIS — R10.10: Primary | ICD-10-CM

## 2024-02-08 DIAGNOSIS — K31.89: ICD-10-CM

## 2024-02-08 PROCEDURE — 43239 EGD BIOPSY SINGLE/MULTIPLE: CPT

## 2024-02-08 PROCEDURE — 88305 TISSUE EXAM BY PATHOLOGIST: CPT

## 2024-02-14 ENCOUNTER — HOSPITAL ENCOUNTER (OUTPATIENT)
Dept: HOSPITAL 99 - RAD | Age: 44
End: 2024-02-14
Payer: COMMERCIAL

## 2024-02-14 DIAGNOSIS — K92.1: Primary | ICD-10-CM

## 2024-02-14 DIAGNOSIS — R10.13: ICD-10-CM

## 2024-05-09 ENCOUNTER — HOSPITAL ENCOUNTER (EMERGENCY)
Dept: HOSPITAL 99 - EMR | Age: 44
Discharge: HOME | End: 2024-05-09
Payer: COMMERCIAL

## 2024-05-09 VITALS — SYSTOLIC BLOOD PRESSURE: 187 MMHG | DIASTOLIC BLOOD PRESSURE: 97 MMHG | RESPIRATION RATE: 20 BRPM

## 2024-05-09 VITALS — DIASTOLIC BLOOD PRESSURE: 83 MMHG | SYSTOLIC BLOOD PRESSURE: 172 MMHG

## 2024-05-09 DIAGNOSIS — I10: ICD-10-CM

## 2024-05-09 DIAGNOSIS — F17.200: ICD-10-CM

## 2024-05-09 DIAGNOSIS — F90.9: ICD-10-CM

## 2024-05-09 DIAGNOSIS — G89.29: ICD-10-CM

## 2024-05-09 DIAGNOSIS — B86: Primary | ICD-10-CM

## 2024-05-09 PROCEDURE — 99282 EMERGENCY DEPT VISIT SF MDM: CPT

## 2024-05-25 ENCOUNTER — HOSPITAL ENCOUNTER (EMERGENCY)
Dept: HOSPITAL 99 - EMR | Age: 44
Discharge: HOME | End: 2024-05-25
Payer: COMMERCIAL

## 2024-05-25 VITALS — RESPIRATION RATE: 18 BRPM | SYSTOLIC BLOOD PRESSURE: 184 MMHG | DIASTOLIC BLOOD PRESSURE: 111 MMHG

## 2024-05-25 DIAGNOSIS — F17.200: ICD-10-CM

## 2024-05-25 DIAGNOSIS — B80: Primary | ICD-10-CM

## 2024-05-25 DIAGNOSIS — M54.9: ICD-10-CM

## 2024-05-25 DIAGNOSIS — F90.9: ICD-10-CM

## 2024-05-25 DIAGNOSIS — R03.0: ICD-10-CM

## 2024-05-25 DIAGNOSIS — G89.29: ICD-10-CM

## 2024-05-25 LAB
ALBUMIN SERPL-MCNC: 3.6 G/DL (ref 3.5–5)
ALP SERPL-CCNC: 64 U/L (ref 38–126)
ALT SERPL-CCNC: 45 U/L (ref 0–35)
AST SERPL-CCNC: 25 U/L (ref 14–36)
BUN SERPL-MCNC: 11 MG/DL (ref 7–17)
CALCIUM SERPL-MCNC: 8.7 MG/DL (ref 8.4–10.2)
CHLORIDE SERPL-SCNC: 105 MMOL/L (ref 98–107)
CO2 SERPL-SCNC: 25 MMOL/L (ref 22–30)
EGFR: > 60
ERYTHROCYTE [DISTWIDTH] IN BLOOD BY AUTOMATED COUNT: 14.1 % (ref 11.5–14.5)
GLUCOSE SERPL-MCNC: 93 MG/DL (ref 70–99)
HCT VFR BLD AUTO: 38.1 % (ref 37–47)
HGB BLD-MCNC: 13.2 G/DL (ref 12–16)
MCHC RBC AUTO-ENTMCNC: 34.6 G/DL (ref 33–37)
MCV RBC AUTO: 84.1 FL (ref 81–99)
NRBC BLD AUTO-RTO: 0 %
PLATELET # BLD AUTO: 289 10^3/UL (ref 130–400)
POTASSIUM SERPL-SCNC: 4.3 MMOL/L (ref 3.5–5.1)
PROT SERPL-MCNC: 6.5 G/DL (ref 6.3–8.2)
SODIUM SERPL-SCNC: 133 MMOL/L (ref 135–145)

## 2024-05-25 PROCEDURE — 99283 EMERGENCY DEPT VISIT LOW MDM: CPT

## 2024-07-29 ENCOUNTER — DOCUMENTATION (OUTPATIENT)
Dept: BEHAVIORAL/MENTAL HEALTH CLINIC | Facility: CLINIC | Age: 44
End: 2024-07-29

## 2024-07-29 NOTE — PROGRESS NOTES
Psychotherapy Discharge Summary    Preferred Name: Kira Rivas  YOB: 1980    Admission date to psychotherapy: 10/28/22    Referred by: Self    Presenting Problem: Depression, anxiety, PTSD    Course of treatment included : medication management, psychoeducation, and individual therapy     Progress/Outcome of Treatment Goals (brief summary of course of treatment) Client made minor progress as she struggled to take responsibility for her role in situations    Treatment Complications (if any): client struggled with taking responsibility for her role in situations. Client was living in non-stable living situations. Trauma hx. Client struggled with keeping her therapy appointments.    Treatment Progress: poor    Current [facility] Psychiatric Provider: NA    Discharge Medications include: NA    Discharge Date: 07/29/24    Discharge Diagnosis: No diagnosis found.    Criteria for Discharge:  Client decided to switch all of her remaining services to [facility]    Aftercare recommendations include (include specific referral names and phone numbers, if appropriate): Client will continue with therapy and medication management and case management at [facility]    Prognosis: poor